# Patient Record
Sex: FEMALE | Race: WHITE | Employment: OTHER | URBAN - METROPOLITAN AREA
[De-identification: names, ages, dates, MRNs, and addresses within clinical notes are randomized per-mention and may not be internally consistent; named-entity substitution may affect disease eponyms.]

---

## 2022-02-22 ENCOUNTER — HOSPITAL ENCOUNTER (EMERGENCY)
Age: 72
Discharge: HOME | End: 2022-02-22
Attending: STUDENT IN AN ORGANIZED HEALTH CARE EDUCATION/TRAINING PROGRAM
Payer: MEDICARE

## 2022-02-22 VITALS
SYSTOLIC BLOOD PRESSURE: 180 MMHG | WEIGHT: 190 LBS | BODY MASS INDEX: 25.73 KG/M2 | OXYGEN SATURATION: 99 % | DIASTOLIC BLOOD PRESSURE: 99 MMHG | RESPIRATION RATE: 18 BRPM | TEMPERATURE: 98.7 F | HEIGHT: 72 IN | HEART RATE: 90 BPM

## 2022-02-22 DIAGNOSIS — M54.41 ACUTE BILATERAL LOW BACK PAIN WITH RIGHT-SIDED SCIATICA: Primary | ICD-10-CM

## 2022-02-22 DIAGNOSIS — R03.0 ELEVATED BLOOD PRESSURE READING: ICD-10-CM

## 2022-02-22 PROCEDURE — 99282 EMERGENCY DEPT VISIT SF MDM: CPT

## 2022-02-22 PROCEDURE — 96372 THER/PROPH/DIAG INJ SC/IM: CPT

## 2022-02-22 PROCEDURE — 6360000002 HC RX W HCPCS: Performed by: STUDENT IN AN ORGANIZED HEALTH CARE EDUCATION/TRAINING PROGRAM

## 2022-02-22 PROCEDURE — 6370000000 HC RX 637 (ALT 250 FOR IP): Performed by: STUDENT IN AN ORGANIZED HEALTH CARE EDUCATION/TRAINING PROGRAM

## 2022-02-22 RX ORDER — ACETAMINOPHEN 325 MG/1
650 TABLET ORAL ONCE
Status: COMPLETED | OUTPATIENT
Start: 2022-02-22 | End: 2022-02-22

## 2022-02-22 RX ORDER — ORPHENADRINE CITRATE 100 MG/1
100 TABLET, EXTENDED RELEASE ORAL 2 TIMES DAILY
Qty: 20 TABLET | Refills: 0 | Status: SHIPPED | OUTPATIENT
Start: 2022-02-22 | End: 2022-03-04

## 2022-02-22 RX ORDER — KETOROLAC TROMETHAMINE 30 MG/ML
15 INJECTION, SOLUTION INTRAMUSCULAR; INTRAVENOUS ONCE
Status: COMPLETED | OUTPATIENT
Start: 2022-02-22 | End: 2022-02-22

## 2022-02-22 RX ORDER — ACETAMINOPHEN 500 MG
500 TABLET ORAL 4 TIMES DAILY PRN
Qty: 120 TABLET | Refills: 0 | Status: SHIPPED | OUTPATIENT
Start: 2022-02-22

## 2022-02-22 RX ORDER — IBUPROFEN 600 MG/1
600 TABLET ORAL 3 TIMES DAILY PRN
Qty: 30 TABLET | Refills: 0 | Status: SHIPPED | OUTPATIENT
Start: 2022-02-22

## 2022-02-22 RX ORDER — LIDOCAINE 4 G/G
1 PATCH TOPICAL DAILY
Qty: 30 PATCH | Refills: 0 | Status: SHIPPED | OUTPATIENT
Start: 2022-02-22 | End: 2022-03-24

## 2022-02-22 RX ORDER — ORPHENADRINE CITRATE 30 MG/ML
60 INJECTION INTRAMUSCULAR; INTRAVENOUS ONCE
Status: COMPLETED | OUTPATIENT
Start: 2022-02-22 | End: 2022-02-22

## 2022-02-22 RX ADMIN — ACETAMINOPHEN 650 MG: 325 TABLET ORAL at 16:41

## 2022-02-22 RX ADMIN — KETOROLAC TROMETHAMINE 15 MG: 30 INJECTION, SOLUTION INTRAMUSCULAR at 16:41

## 2022-02-22 RX ADMIN — ORPHENADRINE CITRATE 60 MG: 30 INJECTION INTRAMUSCULAR; INTRAVENOUS at 16:41

## 2022-02-22 ASSESSMENT — PAIN DESCRIPTION - DESCRIPTORS: DESCRIPTORS: ACHING

## 2022-02-22 ASSESSMENT — PAIN DESCRIPTION - LOCATION: LOCATION: BACK

## 2022-02-22 ASSESSMENT — PAIN DESCRIPTION - PROGRESSION: CLINICAL_PROGRESSION: NOT CHANGED

## 2022-02-22 ASSESSMENT — PAIN DESCRIPTION - ORIENTATION: ORIENTATION: LOWER

## 2022-02-22 ASSESSMENT — PAIN DESCRIPTION - FREQUENCY: FREQUENCY: CONTINUOUS

## 2022-02-22 ASSESSMENT — PAIN SCALES - GENERAL: PAINLEVEL_OUTOF10: 8

## 2022-02-22 ASSESSMENT — PAIN DESCRIPTION - PAIN TYPE: TYPE: CHRONIC PAIN

## 2022-02-22 ASSESSMENT — PAIN DESCRIPTION - ONSET: ONSET: ON-GOING

## 2022-02-22 NOTE — ED PROVIDER NOTES
Department of Emergency Medicine   ED  Provider Note  Admit Date/RoomTime: 2/22/2022  3:32 PM  ED Room: 08/08          History of Present Illness:  2/22/22, Time: 4:33 PM EST  Chief Complaint   Patient presents with    Back Pain     on going for months. Bernice Byers is a 73 y.o. female presenting to the ED for low back pain. The patient states that she has had sciatica for years. For the past 2 to 3 weeks she has been having low back pain. This is a dull ache but at times it shoots down her bilateral legs. This is similar to her prior sciatica. Pain is intermittent. Certain positions seem to worsen it. Nothing improves it. She is not tried any medication. She denies any recent trauma, numbness, tingling or weakness. No incontinence. No history of IV drug use. No fevers. No perirectal paresthesias. Patient ambulates without difficulty. No dysuria or hematuria. No chest pain, shortness of breath or abdominal pain. Review of Systems:   Constitutional symptoms: Denies fever  Eyes: Denies eye pain  Ears, Nose, Mouth, Throat: Denies ear pain  Cardiovascular: Denies chest pain  Respiratory: Denies shortness of breath  Gastrointestinal: Denies blood per rectum  Genitourinary: Denies hematuria  Skin: Denies rashes  Neurological: Denies headache  Musculoskeletal: Positive for low back pain shooting down the legs    --------------------------------------------- PAST HISTORY ---------------------------------------------  Past Medical History:  has a past medical history of Nephritis. Past Surgical History:  has a past surgical history that includes Hysterectomy and Tonsillectomy. Social History:  reports that she has quit smoking. She does not have any smokeless tobacco history on file. She reports previous alcohol use. She reports that she does not use drugs. Family History: family history is not on file. . Unless otherwise noted, family history is non contributory    The patients home medications have been reviewed. Allergies: Pcn [penicillins]    I have reviewed the past medical history, past surgical history, social history, and family history    ---------------------------------------------------PHYSICAL EXAM--------------------------------------    General: The patient is conversational and lying comfortably in bed. Head: Normocephalic and atraumatic. Eyes: Sclera non-icteric and no conjunctival injection  ENT: Nasal and oral membranes moist  Neck: Trachea midline. No JVD  Respiratory: Lungs clear to auscultation bilaterally. Patient speaking in full sentences. Cardiovascular: Regular rate. No pedal edema. 2+ DP pulses  Gastrointestinal:  Abdomen is soft and nondistended. Bowel sounds present. There is no tenderness. There is no guarding or rebound. Musculoskeletal: No deformity. No tenderness of the midline spine. No step-offs or deformities. Pelvis stable. No bony tenderness of the extremities. Patient able to flex and extend at the hips and knees. Plantar flexion dorsiflexion of the ankle symmetric. Positive straight leg raise on the right and negative on the left  Skin: Skin warm and dry. No rashes. Neurologic: No gross motor deficits. No aphasia. 5 out of 5 strength of the lower extremities. Sensation intact to light touch. Patient ambulating without difficulty  Psychiatric: Normal affect.    -------------------------------------------------- RESULTS -------------------------------------------------  I have personally reviewed all laboratory and imaging results for this patient. Results are listed below.      LABS: (Lab results interpreted by me)  No results found for this visit on 02/22/22.,     RADIOLOGY:  Interpreted by Radiologist unless otherwise specified  No orders to display       ------------------------- NURSING NOTES AND VITALS REVIEWED ---------------------------   The nursing notes within the ED encounter and vital signs as below have been reviewed by myself  BP (!) 180/99   Pulse 90   Temp 98.7 °F (37.1 °C) (Tympanic)   Resp 18   Ht 6' (1.829 m)   Wt 190 lb (86.2 kg)   SpO2 99%   BMI 25.77 kg/m²     Oxygen Saturation Interpretation: Normal    The patients available past medical records and past encounters were reviewed. ------------------------------ ED COURSE/MEDICAL DECISION MAKING----------------------  Medications   ketorolac (TORADOL) injection 15 mg (15 mg IntraMUSCular Given 2/22/22 1641)   acetaminophen (TYLENOL) tablet 650 mg (650 mg Oral Given 2/22/22 1641)   orphenadrine (NORFLEX) injection 60 mg (60 mg IntraMUSCular Given 2/22/22 1641)       Medical Decision Making: This is a 73 y.o. female presenting to the ED for low back pain. On initial presentation, the patient's vital signs are interpreted as hypertensive, afebrile and saturating well on room air. Based on history and physical exam, we have a broad differential.  The patient has known history of sciatica. Her presentation is consistent with sciatica today. No history of trauma or infection. Patient distally neurovascularly intact. At this time, she will be given Toradol, Tylenol and Norflex. Patient symptoms have improved. She is ambulatory. She will be given prescriptions for Tylenol, ibuprofen, lidocaine patches and Norflex. She is given neurosurgery contact information as well as a primary care physician. She is given strict return precautions and should follow up. Her blood pressure is elevated today and this will require recheck by her primary physician. Verbalized understanding and agreeable to the plan. This patient's ED course included: a personal history and physicial examination and re-evaluation prior to disposition    This patient has remained hemodynamically stable during their ED course. Consultations:  None    Oxygen Saturation Interpretation: 99 % on room air.   Normal    Counseling:   I have spoken with the patient and discussed todays results, in addition to providing specific details for the plan of care and counseling regarding the diagnosis and prognosis. Questions are answered at this time and they are agreeable with the plan.     --------------------------------- IMPRESSION AND DISPOSITION ---------------------------------    IMPRESSION  1. Acute bilateral low back pain with right-sided sciatica    2. Elevated blood pressure reading        DISPOSITION  Disposition: Discharge to home  Patient condition is fair    I, Dr. Eden Arenas, am the primary provider of record    NOTE: This report was transcribed using voice recognition software.  Every effort was made to ensure accuracy; however, inadvertent computerized transcription errors may be present        Eden Arenas MD  02/22/22 5762

## 2022-06-06 ENCOUNTER — APPOINTMENT (OUTPATIENT)
Dept: GENERAL RADIOLOGY | Age: 72
DRG: 078 | End: 2022-06-06
Payer: MEDICARE

## 2022-06-06 ENCOUNTER — HOSPITAL ENCOUNTER (INPATIENT)
Age: 72
LOS: 6 days | Discharge: NURSING, REHAB OR LONG-TERM CARE | DRG: 078 | End: 2022-06-13
Attending: EMERGENCY MEDICINE | Admitting: FAMILY MEDICINE
Payer: MEDICARE

## 2022-06-06 ENCOUNTER — APPOINTMENT (OUTPATIENT)
Dept: CT IMAGING | Age: 72
DRG: 078 | End: 2022-06-06
Payer: MEDICARE

## 2022-06-06 DIAGNOSIS — J18.9 COMMUNITY ACQUIRED PNEUMONIA OF RIGHT LOWER LOBE OF LUNG: ICD-10-CM

## 2022-06-06 DIAGNOSIS — N17.9 AKI (ACUTE KIDNEY INJURY) (HCC): ICD-10-CM

## 2022-06-06 DIAGNOSIS — R41.82 ALTERED MENTAL STATUS, UNSPECIFIED ALTERED MENTAL STATUS TYPE: Primary | ICD-10-CM

## 2022-06-06 DIAGNOSIS — N30.00 ACUTE CYSTITIS WITHOUT HEMATURIA: ICD-10-CM

## 2022-06-06 LAB
ACETAMINOPHEN LEVEL: <5 MCG/ML (ref 10–30)
ALBUMIN SERPL-MCNC: 4.3 G/DL (ref 3.5–5.2)
ALP BLD-CCNC: 74 U/L (ref 35–104)
ALT SERPL-CCNC: 9 U/L (ref 0–32)
AMMONIA: 18 UMOL/L (ref 11–51)
ANION GAP SERPL CALCULATED.3IONS-SCNC: 13 MMOL/L (ref 7–16)
APTT: 22.5 SEC (ref 24.5–35.1)
AST SERPL-CCNC: 14 U/L (ref 0–31)
BASOPHILS ABSOLUTE: 0.05 E9/L (ref 0–0.2)
BASOPHILS RELATIVE PERCENT: 0.4 % (ref 0–2)
BILIRUB SERPL-MCNC: 0.6 MG/DL (ref 0–1.2)
BUN BLDV-MCNC: 18 MG/DL (ref 6–23)
CALCIUM SERPL-MCNC: 9.2 MG/DL (ref 8.6–10.2)
CHLORIDE BLD-SCNC: 101 MMOL/L (ref 98–107)
CHP ED QC CHECK: NORMAL
CO2: 23 MMOL/L (ref 22–29)
CREAT SERPL-MCNC: 1.2 MG/DL (ref 0.5–1)
EOSINOPHILS ABSOLUTE: 0.07 E9/L (ref 0.05–0.5)
EOSINOPHILS RELATIVE PERCENT: 0.5 % (ref 0–6)
ETHANOL: <10 MG/DL (ref 0–0.08)
GFR AFRICAN AMERICAN: 54
GFR NON-AFRICAN AMERICAN: 44 ML/MIN/1.73
GLUCOSE BLD-MCNC: 103 MG/DL
GLUCOSE BLD-MCNC: 117 MG/DL (ref 74–99)
HCT VFR BLD CALC: 43.1 % (ref 34–48)
HEMOGLOBIN: 14.4 G/DL (ref 11.5–15.5)
IMMATURE GRANULOCYTES #: 0.07 E9/L
IMMATURE GRANULOCYTES %: 0.5 % (ref 0–5)
INR BLD: 1
LACTIC ACID: 0.7 MMOL/L (ref 0.5–2.2)
LYMPHOCYTES ABSOLUTE: 1.75 E9/L (ref 1.5–4)
LYMPHOCYTES RELATIVE PERCENT: 13.1 % (ref 20–42)
MCH RBC QN AUTO: 30.5 PG (ref 26–35)
MCHC RBC AUTO-ENTMCNC: 33.4 % (ref 32–34.5)
MCV RBC AUTO: 91.3 FL (ref 80–99.9)
METER GLUCOSE: 103 MG/DL (ref 74–99)
MONOCYTES ABSOLUTE: 0.95 E9/L (ref 0.1–0.95)
MONOCYTES RELATIVE PERCENT: 7.1 % (ref 2–12)
NEUTROPHILS ABSOLUTE: 10.5 E9/L (ref 1.8–7.3)
NEUTROPHILS RELATIVE PERCENT: 78.4 % (ref 43–80)
PDW BLD-RTO: 12.8 FL (ref 11.5–15)
PLATELET # BLD: 256 E9/L (ref 130–450)
PMV BLD AUTO: 8.8 FL (ref 7–12)
POTASSIUM REFLEX MAGNESIUM: 4.1 MMOL/L (ref 3.5–5)
PRO-BNP: 565 PG/ML (ref 0–125)
PROTHROMBIN TIME: 10.8 SEC (ref 9.3–12.4)
RBC # BLD: 4.72 E12/L (ref 3.5–5.5)
REASON FOR REJECTION: NORMAL
REJECTED TEST: NORMAL
SALICYLATE, SERUM: <0.3 MG/DL (ref 0–30)
SODIUM BLD-SCNC: 137 MMOL/L (ref 132–146)
TOTAL CK: 42 U/L (ref 20–180)
TOTAL PROTEIN: 7 G/DL (ref 6.4–8.3)
TRICYCLIC ANTIDEPRESSANTS SCREEN SERUM: NEGATIVE NG/ML
TROPONIN, HIGH SENSITIVITY: 21 NG/L (ref 0–9)
TROPONIN, HIGH SENSITIVITY: 22 NG/L (ref 0–9)
WBC # BLD: 13.4 E9/L (ref 4.5–11.5)

## 2022-06-06 PROCEDURE — 85610 PROTHROMBIN TIME: CPT

## 2022-06-06 PROCEDURE — 83880 ASSAY OF NATRIURETIC PEPTIDE: CPT

## 2022-06-06 PROCEDURE — 80179 DRUG ASSAY SALICYLATE: CPT

## 2022-06-06 PROCEDURE — 71045 X-RAY EXAM CHEST 1 VIEW: CPT

## 2022-06-06 PROCEDURE — 93005 ELECTROCARDIOGRAM TRACING: CPT | Performed by: STUDENT IN AN ORGANIZED HEALTH CARE EDUCATION/TRAINING PROGRAM

## 2022-06-06 PROCEDURE — 80143 DRUG ASSAY ACETAMINOPHEN: CPT

## 2022-06-06 PROCEDURE — 81001 URINALYSIS AUTO W/SCOPE: CPT

## 2022-06-06 PROCEDURE — 70450 CT HEAD/BRAIN W/O DYE: CPT

## 2022-06-06 PROCEDURE — 82077 ASSAY SPEC XCP UR&BREATH IA: CPT

## 2022-06-06 PROCEDURE — 83605 ASSAY OF LACTIC ACID: CPT

## 2022-06-06 PROCEDURE — 85025 COMPLETE CBC W/AUTO DIFF WBC: CPT

## 2022-06-06 PROCEDURE — 84484 ASSAY OF TROPONIN QUANT: CPT

## 2022-06-06 PROCEDURE — 85730 THROMBOPLASTIN TIME PARTIAL: CPT

## 2022-06-06 PROCEDURE — 80053 COMPREHEN METABOLIC PANEL: CPT

## 2022-06-06 PROCEDURE — 80307 DRUG TEST PRSMV CHEM ANLYZR: CPT

## 2022-06-06 PROCEDURE — 82550 ASSAY OF CK (CPK): CPT

## 2022-06-06 PROCEDURE — 82962 GLUCOSE BLOOD TEST: CPT

## 2022-06-06 PROCEDURE — 82140 ASSAY OF AMMONIA: CPT

## 2022-06-06 PROCEDURE — 51701 INSERT BLADDER CATHETER: CPT

## 2022-06-06 RX ORDER — LEVOFLOXACIN 5 MG/ML
750 INJECTION, SOLUTION INTRAVENOUS ONCE
Status: DISCONTINUED | OUTPATIENT
Start: 2022-06-06 | End: 2022-06-07

## 2022-06-06 ASSESSMENT — PAIN - FUNCTIONAL ASSESSMENT: PAIN_FUNCTIONAL_ASSESSMENT: NONE - DENIES PAIN

## 2022-06-06 NOTE — LETTER
[address] Medicaid  CERTIFICATION OF NECESSITY  FOR TRANSPORTATION   BY WHEELCHAIR VAN     Individual Information   1. Name: Avinash Asekw 2. [place] Medicaid Billing Number:    3. Address: [address] [place] [address]  [address]      Transportation Provider Information   4. Provider Name: rufino   5. [place] Medicaid Provider Number:  National Provider Identifier (NPI):      Certification  7. Criteria:  By signing this document, the practitioner certifies that two statements are true:  A. This individual must be accompanied by a mobility-related assistive device from the point of pick-up to the point of drop-off. B. Transport of this individual by standard passenger vehicle or common carrier is precluded or contraindicated. 8. Period Beginning Date: 2022     9. Length  [x] Not more than 1 day(s)  [] One Year     Additional Information Relevant to Certification   10. Comments or Explanations, If Necessary or Appropriate     Weakness        Certifying Practitioner Information   11. Name of Practitioner: Sera Licea MD   12. [place] Medicaid Provider Number, If Applicable:  [address] Provider Identifier (NPI):      Signature Information   14. Date of Signature: 2022  15. Name of Person Signing: Judy Holbrook RN    16. Signature and Professional Designation: Electronically signed by Judy Holbrook RN on 2022 at 11:05 AM       [facility] [id number]  Rev. 2015          [facility] [address] Encounter Date/Time: 2022 [facility] Account: [de-identified]    MRN: [id number]    Patient: Avinash Askew    Contact Serial #: [id number]      ENCOUNTER          Patient Class: I Private Enc?   No Unit RM BD: SEYZ 5-B   Hospital Service: MED   Encounter DX: Pneumonia [J18.9]   ADM Provider: Dale Tucker DO   Procedure:     ATT Provider: Sera Licea MD   REF Provider:        Admission DX: Pneumonia and DX codes: J18.9      PATIENT                 Name: Avinash Askew : 1950 (71 yrs)   Address: [address] Sex: Female   City: [address] [place] [address]         Marital Status: Unknown   Employer: RETIRED         Sikh: Anabaptist   Primary Care Provider:           Primary Phone: [phone]   EMERGENCY CONTACT   Contact Name Legal Guardian? Relationship to Patient Home Phone Work Phone   1. *No Contact Specified*  2. *No Contact Specified*                             GUARANTOR            Guarantor: Jovita Meléndez     : 1950   Address: [address] [place] [address] Sex: Female     [place] [id number]     Relation to Patient: Self       Home Phone: [phone]   Guarantor ID: [id number]       Work Phone:     Guarantor Employer: RETIRED         Status: RETIRED      COVERAGE        PRIMARY INSURANCE   Payor: MEDICARE Plan: MEDICARE PART A AND B   Payor Address: [place] [address],  [facility], [facility]       Group Number:   Insurance Type: INDEMNITY   Subscriber Name: Gretchen Guerrero : 1950   Subscriber ID: [id number] Pat. Rel. to Sub: Self   SECONDARY INSURANCE   Payor:   Plan:     Payor Address:  ,           Group Number:   Insurance Type:     Subscriber Name:   Subscriber :     Subscriber ID:   Pat.  Rel. to Sub:             CSN: [id number]

## 2022-06-06 NOTE — LETTER
[address] [address]  Phone: [phone]  Fax: [phone]      June 13, 2022     Patient: Rhiannon Hope   MR Number: [id number]   YOB: 1950   Date of Visit: 6/6/2022       To Whom It May Concern: It is my professional opinion that Rhiannon Hope is not capable of safely living independently at this time. I recommend 24 hour supervision by a responsible party. If you have questions, please do not hesitate to call me.      Sincerely,      Kathleen Plaza, [facility] [address] [facility] Physician Hospitalist  [phone]

## 2022-06-07 PROBLEM — J18.9 PNEUMONIA: Status: ACTIVE | Noted: 2022-06-07

## 2022-06-07 LAB
ALBUMIN SERPL-MCNC: 4.1 G/DL (ref 3.5–5.2)
ALP BLD-CCNC: 75 U/L (ref 35–104)
ALT SERPL-CCNC: 9 U/L (ref 0–32)
AMPHETAMINE SCREEN, URINE: NOT DETECTED
ANION GAP SERPL CALCULATED.3IONS-SCNC: 14 MMOL/L (ref 7–16)
AST SERPL-CCNC: 15 U/L (ref 0–31)
BACTERIA: ABNORMAL /HPF
BARBITURATE SCREEN URINE: NOT DETECTED
BASOPHILS ABSOLUTE: 0.05 E9/L (ref 0–0.2)
BASOPHILS RELATIVE PERCENT: 0.4 % (ref 0–2)
BENZODIAZEPINE SCREEN, URINE: NOT DETECTED
BILIRUB SERPL-MCNC: 0.6 MG/DL (ref 0–1.2)
BILIRUBIN URINE: NEGATIVE
BLOOD, URINE: NEGATIVE
BUN BLDV-MCNC: 17 MG/DL (ref 6–23)
CALCIUM SERPL-MCNC: 9.2 MG/DL (ref 8.6–10.2)
CANNABINOID SCREEN URINE: NOT DETECTED
CHLORIDE BLD-SCNC: 101 MMOL/L (ref 98–107)
CLARITY: ABNORMAL
CO2: 20 MMOL/L (ref 22–29)
COCAINE METABOLITE SCREEN URINE: NOT DETECTED
COLOR: YELLOW
CREAT SERPL-MCNC: 1.2 MG/DL (ref 0.5–1)
EOSINOPHILS ABSOLUTE: 0.13 E9/L (ref 0.05–0.5)
EOSINOPHILS RELATIVE PERCENT: 1 % (ref 0–6)
EPITHELIAL CELLS, UA: ABNORMAL /HPF
FENTANYL SCREEN, URINE: NOT DETECTED
GFR AFRICAN AMERICAN: 54
GFR NON-AFRICAN AMERICAN: 44 ML/MIN/1.73
GLUCOSE BLD-MCNC: 124 MG/DL (ref 74–99)
GLUCOSE URINE: NEGATIVE MG/DL
HCT VFR BLD CALC: 42 % (ref 34–48)
HEMOGLOBIN: 14 G/DL (ref 11.5–15.5)
IMMATURE GRANULOCYTES #: 0.06 E9/L
IMMATURE GRANULOCYTES %: 0.5 % (ref 0–5)
KETONES, URINE: NEGATIVE MG/DL
LACTIC ACID: 1 MMOL/L (ref 0.5–2.2)
LEUKOCYTE ESTERASE, URINE: NEGATIVE
LYMPHOCYTES ABSOLUTE: 1.73 E9/L (ref 1.5–4)
LYMPHOCYTES RELATIVE PERCENT: 13 % (ref 20–42)
Lab: NORMAL
MCH RBC QN AUTO: 30.2 PG (ref 26–35)
MCHC RBC AUTO-ENTMCNC: 33.3 % (ref 32–34.5)
MCV RBC AUTO: 90.7 FL (ref 80–99.9)
METHADONE SCREEN, URINE: NOT DETECTED
MONOCYTES ABSOLUTE: 1.06 E9/L (ref 0.1–0.95)
MONOCYTES RELATIVE PERCENT: 8 % (ref 2–12)
NEUTROPHILS ABSOLUTE: 10.24 E9/L (ref 1.8–7.3)
NEUTROPHILS RELATIVE PERCENT: 77.1 % (ref 43–80)
NITRITE, URINE: POSITIVE
OPIATE SCREEN URINE: NOT DETECTED
OXYCODONE URINE: NOT DETECTED
PDW BLD-RTO: 13 FL (ref 11.5–15)
PH UA: 5.5 (ref 5–9)
PHENCYCLIDINE SCREEN URINE: NOT DETECTED
PLATELET # BLD: 274 E9/L (ref 130–450)
PMV BLD AUTO: 8.9 FL (ref 7–12)
POTASSIUM REFLEX MAGNESIUM: 4.1 MMOL/L (ref 3.5–5)
PROCALCITONIN: 0.06 NG/ML (ref 0–0.08)
PROTEIN UA: 30 MG/DL
RBC # BLD: 4.63 E12/L (ref 3.5–5.5)
RBC UA: ABNORMAL /HPF (ref 0–2)
SODIUM BLD-SCNC: 135 MMOL/L (ref 132–146)
SPECIFIC GRAVITY UA: 1.02 (ref 1–1.03)
TOTAL PROTEIN: 6.9 G/DL (ref 6.4–8.3)
UROBILINOGEN, URINE: 0.2 E.U./DL
WBC # BLD: 13.3 E9/L (ref 4.5–11.5)
WBC UA: ABNORMAL /HPF (ref 0–5)

## 2022-06-07 PROCEDURE — 99285 EMERGENCY DEPT VISIT HI MDM: CPT

## 2022-06-07 PROCEDURE — 36415 COLL VENOUS BLD VENIPUNCTURE: CPT

## 2022-06-07 PROCEDURE — 2580000003 HC RX 258: Performed by: FAMILY MEDICINE

## 2022-06-07 PROCEDURE — 87088 URINE BACTERIA CULTURE: CPT

## 2022-06-07 PROCEDURE — 87040 BLOOD CULTURE FOR BACTERIA: CPT

## 2022-06-07 PROCEDURE — 6360000002 HC RX W HCPCS: Performed by: FAMILY MEDICINE

## 2022-06-07 PROCEDURE — 83605 ASSAY OF LACTIC ACID: CPT

## 2022-06-07 PROCEDURE — 84145 PROCALCITONIN (PCT): CPT

## 2022-06-07 PROCEDURE — 2500000003 HC RX 250 WO HCPCS: Performed by: STUDENT IN AN ORGANIZED HEALTH CARE EDUCATION/TRAINING PROGRAM

## 2022-06-07 PROCEDURE — 2500000003 HC RX 250 WO HCPCS: Performed by: INTERNAL MEDICINE

## 2022-06-07 PROCEDURE — 85025 COMPLETE CBC W/AUTO DIFF WBC: CPT

## 2022-06-07 PROCEDURE — 87186 SC STD MICRODIL/AGAR DIL: CPT

## 2022-06-07 PROCEDURE — 1200000000 HC SEMI PRIVATE

## 2022-06-07 PROCEDURE — 87077 CULTURE AEROBIC IDENTIFY: CPT

## 2022-06-07 PROCEDURE — 97165 OT EVAL LOW COMPLEX 30 MIN: CPT

## 2022-06-07 PROCEDURE — 80053 COMPREHEN METABOLIC PANEL: CPT

## 2022-06-07 PROCEDURE — 87150 DNA/RNA AMPLIFIED PROBE: CPT

## 2022-06-07 PROCEDURE — 6370000000 HC RX 637 (ALT 250 FOR IP): Performed by: FAMILY MEDICINE

## 2022-06-07 RX ORDER — ACETAMINOPHEN 650 MG/1
650 SUPPOSITORY RECTAL EVERY 6 HOURS PRN
Status: DISCONTINUED | OUTPATIENT
Start: 2022-06-07 | End: 2022-06-13 | Stop reason: HOSPADM

## 2022-06-07 RX ORDER — ACETAMINOPHEN 325 MG/1
650 TABLET ORAL EVERY 6 HOURS PRN
Status: DISCONTINUED | OUTPATIENT
Start: 2022-06-07 | End: 2022-06-13 | Stop reason: HOSPADM

## 2022-06-07 RX ORDER — SODIUM CHLORIDE 0.9 % (FLUSH) 0.9 %
10 SYRINGE (ML) INJECTION PRN
Status: DISCONTINUED | OUTPATIENT
Start: 2022-06-07 | End: 2022-06-13 | Stop reason: HOSPADM

## 2022-06-07 RX ORDER — SODIUM CHLORIDE 9 MG/ML
INJECTION, SOLUTION INTRAVENOUS PRN
Status: DISCONTINUED | OUTPATIENT
Start: 2022-06-07 | End: 2022-06-13 | Stop reason: HOSPADM

## 2022-06-07 RX ORDER — PROMETHAZINE HYDROCHLORIDE 12.5 MG/1
12.5 TABLET ORAL EVERY 6 HOURS PRN
Status: DISCONTINUED | OUTPATIENT
Start: 2022-06-07 | End: 2022-06-13 | Stop reason: HOSPADM

## 2022-06-07 RX ORDER — LEVOFLOXACIN 5 MG/ML
750 INJECTION, SOLUTION INTRAVENOUS EVERY 24 HOURS
Status: DISCONTINUED | OUTPATIENT
Start: 2022-06-07 | End: 2022-06-07

## 2022-06-07 RX ORDER — LABETALOL HYDROCHLORIDE 5 MG/ML
10 INJECTION, SOLUTION INTRAVENOUS ONCE
Status: COMPLETED | OUTPATIENT
Start: 2022-06-07 | End: 2022-06-07

## 2022-06-07 RX ORDER — HYDRALAZINE HYDROCHLORIDE 20 MG/ML
10 INJECTION INTRAMUSCULAR; INTRAVENOUS EVERY 4 HOURS PRN
Status: DISCONTINUED | OUTPATIENT
Start: 2022-06-07 | End: 2022-06-09

## 2022-06-07 RX ORDER — ENOXAPARIN SODIUM 100 MG/ML
40 INJECTION SUBCUTANEOUS DAILY
Status: DISCONTINUED | OUTPATIENT
Start: 2022-06-07 | End: 2022-06-08

## 2022-06-07 RX ORDER — SODIUM CHLORIDE 0.9 % (FLUSH) 0.9 %
10 SYRINGE (ML) INJECTION EVERY 12 HOURS SCHEDULED
Status: DISCONTINUED | OUTPATIENT
Start: 2022-06-07 | End: 2022-06-13 | Stop reason: HOSPADM

## 2022-06-07 RX ORDER — LEVOFLOXACIN 5 MG/ML
750 INJECTION, SOLUTION INTRAVENOUS EVERY 24 HOURS
Status: DISCONTINUED | OUTPATIENT
Start: 2022-06-08 | End: 2022-06-07

## 2022-06-07 RX ORDER — ONDANSETRON 2 MG/ML
4 INJECTION INTRAMUSCULAR; INTRAVENOUS EVERY 6 HOURS PRN
Status: DISCONTINUED | OUTPATIENT
Start: 2022-06-07 | End: 2022-06-13 | Stop reason: HOSPADM

## 2022-06-07 RX ORDER — POLYETHYLENE GLYCOL 3350 17 G/17G
17 POWDER, FOR SOLUTION ORAL DAILY PRN
Status: DISCONTINUED | OUTPATIENT
Start: 2022-06-07 | End: 2022-06-13 | Stop reason: HOSPADM

## 2022-06-07 RX ADMIN — ANTI-FUNGAL POWDER MICONAZOLE NITRATE TALC FREE: 1.42 POWDER TOPICAL at 17:47

## 2022-06-07 RX ADMIN — SODIUM CHLORIDE, PRESERVATIVE FREE 10 ML: 5 INJECTION INTRAVENOUS at 10:51

## 2022-06-07 RX ADMIN — HYDRALAZINE HYDROCHLORIDE 10 MG: 20 INJECTION INTRAMUSCULAR; INTRAVENOUS at 07:05

## 2022-06-07 RX ADMIN — ACETAMINOPHEN 650 MG: 325 TABLET ORAL at 15:15

## 2022-06-07 RX ADMIN — ANTI-FUNGAL POWDER MICONAZOLE NITRATE TALC FREE: 1.42 POWDER TOPICAL at 23:15

## 2022-06-07 RX ADMIN — LEVOFLOXACIN 750 MG: 5 INJECTION, SOLUTION INTRAVENOUS at 01:06

## 2022-06-07 RX ADMIN — ONDANSETRON 4 MG: 2 INJECTION INTRAMUSCULAR; INTRAVENOUS at 08:10

## 2022-06-07 RX ADMIN — LABETALOL HYDROCHLORIDE 10 MG: 5 INJECTION INTRAVENOUS at 13:05

## 2022-06-07 RX ADMIN — HYDRALAZINE HYDROCHLORIDE 10 MG: 20 INJECTION INTRAMUSCULAR; INTRAVENOUS at 10:51

## 2022-06-07 RX ADMIN — SODIUM CHLORIDE, PRESERVATIVE FREE 10 ML: 5 INJECTION INTRAVENOUS at 07:05

## 2022-06-07 RX ADMIN — ENOXAPARIN SODIUM 40 MG: 100 INJECTION SUBCUTANEOUS at 13:05

## 2022-06-07 ASSESSMENT — PAIN SCALES - GENERAL: PAINLEVEL_OUTOF10: 3

## 2022-06-07 NOTE — PROGRESS NOTES
[facility] [address] [address] [facility]      Date:2022                                                  Patient Name: Evangelina Sim  MRN: [id number]  : 1950  Room: [address] [place], [place] [address]A    Evaluating OT: Naif West, [address], OTR/L [id number]  Referring Holly Perales DO  Specific Provider Orders: OT eval and treat   Recommended Adaptive Equipment: shower chair     Diagnosis: PNA   Surgery: none   Pertinent Medical History: none on file   Precautions:  Fall Risk    Assessment of current deficits   [x] Functional mobility  [x]ADLs  [x] Strength               [x]Cognition   [x] Functional transfers   [x] IADLs         [x] Safety Awareness   [x]Endurance   [] Fine Coordination              [x] Balance      [] Vision/perception   [x]Sensation    []Gross Motor Coordination  [] ROM  [] Delirium                   [] Motor Control     OT PLAN OF CARE   OT POC based on physician orders, patient diagnosis and results of clinical assessment    Frequency/Duration: 2-3 days/wk for 2 weeks PRN   Specific OT Treatment to include:   * Instruction/training on adapted ADL techniques and AE recommendations to increase functional independence within precautions       * Training on energy conservation strategies, correct breathing pattern and techniques to improve independence/tolerance for self-care routine  * Functional transfer/mobility training/DME recommendations for increased independence, safety, and fall prevention  * Patient/Family education to increase follow through with safety techniques and functional independence  * Recommendation of environmental modifications for increased safety with functional transfers/mobility and ADLs  * Cognitive retraining/development of therapeutic activities to improve problem solving, judgement, memory, and attention for increased safety/participation in ADL/IADL tasks  * Therapeutic exercise to improve motor endurance, ROM, and functional strength for ADLs/functional transfers  * Therapeutic activities to facilitate/challenge dynamic balance, stand tolerance for increased safety and independence with ADLs  * Neuro-muscular re-education: facilitation of righting/equilibrium reactions, midline orientation, scapular stability/mobility, normalization of muscle tone, and facilitation of volitional active controled movement    Home Living: Pt lives alone in a 2 story home; bed/bath on 2nd level   Bathroom setup: walk in shower   Equipment owned: [facility]  Prior Level of Function: IND with ADLs/IADLs; using no AD for functional mobility   Driving: yes    Pain Level: 0/10  Cognition: A&O: 1/4 (self); Follows 1 step directions, with repetition and increased time   Memory:  fair    Sequencing:  fair    Problem solving:  fair    Judgement/safety:  fair     Functional Assessment:  AM-PAC Daily Activity Raw Score: 18/24   Initial Eval Status  Date: 6/7/22 Treatment Status  Date: STGs=LTGs  Time Frame: 10-14 days   Feeding SBA   SUP   Grooming SBA (standing at sink)   SUP   UB Dressing SBA   SUP   LB Dressing SBA (pt able to use crossing of leg technique to doff/elvira B socks)  SUP    Bathing SBA (simulated)  SUP    Toileting SBA  SUP   Bed Mobility  Log roll: Min A  Supine to sit: Min A  Sit to supine: Min A   Log roll SBA  Supine to sit: SBA   Sit to supine: SBA   Functional Transfers Sit to stand:SBA   Stand to sit:SBA  Commode: SBA  SUP   Functional Mobility SBA (using no AD, to/from bathroom)  SUP   Balance Sitting: SBA  Standing: SBA     Activity Tolerance fair     Visual/  Perceptual Glasses: none              UE ROM: RUE:  WFL  LUE:  WFL  Strength: RUE: grossly 4/5 LUE: grossly 4/5   Strength: B WFL  Fine Motor Coordination:  WFL     Hearing: WFL  Sensation:  No c/o numbness/tingling   Tone:  WFL  Edema: none noted                            Comments:Cleared by RN to see pt.  Upon arrival, patient supine in bed and agreeable to OT session. At end of session, patient supine in bed with call light and phone within reach, all lines and tubes intact. Pt would benefit from continued OT to increase functional independence and quality of life. Treatment: Required re-orientation to year/month/place. Pt appeared latent with following commands and answering questions. Pt required vc's and physical assist for proper technique/safety with hand placement/body mechanics/posture for bed mobility/ADLs/functional tranfers/mobility. Pt required vc's for sequencing/initiation of ADLs/functional transfers. Pt required rest breaks during session. Pt had an episode of emesis at end of session, RN notified. Pt appeared to have tolerated session well and appears motivated/cooperative/pleasant . Pt instructed on use of call light for assistance and fall prevention. Pt demo'ing fair understanding of education provided. Continue to educate. Eval Complexity: Low    Rehab Potential: Good for established goals, pt. assisted in establishment of goals. LTG: maximize independence with ADLs to return to PLOF    Patient  instructed on diagnosis, prognosis/goals and plan of care. Demonstrated fair understanding. [] Malnutrition indicators have been identified and nursing has been notified to ensure a dietitian consult is ordered. Evaluation time includes thorough review of current medical information, gathering information on past medical & social history & PLOF, completion of standardized testing, informal observation of tasks, consultation with other medical professions/disciplines, assessment of data & development of POC/goals.      Time In: 1979       Time Out: 0800     Total treatment time: 0       Treatment Charges: Mins Units   OT Eval Low 47603 X    OT Eval Medium 21123     OT Eval High 11599     OT Re-Eval 57623     Ther Ex  88951       Manual Therapy [address] [facility] [address]       ADL/Home t 93475     Neuro Re-ed 96969       Group Therapy        Orthotic manage/training  61521       Non-Billable Time           Aleyda Ann, OTR/L [id number]

## 2022-06-07 NOTE — ED NOTES
PT ambulates to restroom with unsteady gait, PT friend says this is her normal walking pattern     Jonny HsiehKaleida Health  06/07/22 5796

## 2022-06-07 NOTE — ED PROVIDER NOTES
[facility]  Department of Emergency Medicine     Written by: Lisa Romo DO  Patient Name: Kesha Nuñez Date: 2022  6:08 PM  MRN: [id number]                   : 1950        Chief Complaint   Patient presents with    Altered Mental Status     male friend called EMS for AMS for 2 days, patient unsure why she is here    - Chief complaint    Patient is a 72-year-old female no significant past medical history. Patient presents with chief complaint of altered mental status. Patient recently moved in with a male friend and for the last 2 days has found to be altered. Patient is usually ANO x4. However for the last couple days she has been acting appropriately and is alert and oriented only person and place. Does appear to be moderate in severity and constant since onset. On arrival patient is alert and oriented to person as well as place but not time or condition. She denies any specific complaints. She states that she has been eating and drinking well. Further history review of systems limited secondary to patient's altered mental status. The history is provided by the patient. No  was used. Review of Systems   Unable to perform ROS: Mental status change        Physical Exam  Vitals and nursing note reviewed. Constitutional:       General: She is not in acute distress. Appearance: Normal appearance. HENT:      Head: Normocephalic and atraumatic. Nose: No congestion or rhinorrhea. Mouth/Throat:      Mouth: Mucous membranes are moist.      Pharynx: Oropharynx is clear. Eyes:      Extraocular Movements: Extraocular movements intact. Pupils: Pupils are equal, round, and reactive to light. Cardiovascular:      Rate and Rhythm: Normal rate and regular rhythm. Heart sounds: No murmur heard. No gallop. Pulmonary:      Effort: Pulmonary effort is normal. No respiratory distress. Breath sounds:  No wheezing, rhonchi or rales. Abdominal:      General: Abdomen is flat. Palpations: Abdomen is soft. There is no mass. Tenderness: There is no abdominal tenderness. There is no guarding. Hernia: No hernia is present. Musculoskeletal:         General: No swelling, tenderness or signs of injury. Normal range of motion. Cervical back: Normal range of motion. No rigidity. No muscular tenderness. Skin:     General: Skin is warm and dry. Capillary Refill: Capillary refill takes less than 2 seconds. Neurological:      General: No focal deficit present. Mental Status: She is alert. She is disoriented. Comments: On neurological exam patient alert and oriented person place but not time or condition. Muscle strength 5 out of 5 to bilateral upper and lower extremities, sensation intact to light touch. Nipples equal and reactive to light, no ataxia noted with finger-to-nose or heel-to-shin. Psychiatric:         Mood and Affect: Mood normal.         Behavior: Behavior normal.          Procedures       MDM  Number of Diagnoses or Management Options  Acute cystitis without hematuria  Altered mental status, unspecified altered mental status type  Community acquired pneumonia of right lower lobe of lung  Diagnosis management comments: Patient is a 45-year-old female no significant past medical history. Patient presents with chief complaint of altered mental status. Vital signs stable presentations of her elevated blood pressure. On physical exam patient alert and oriented to person and place but not time or condition. Heart regular rate and rhythm, lungs clear to auscultation bilaterally, abdomen soft nontender no rebound or guarding. Patient is neurologically intact no focal deficits, strength 5 out of 5 to bilateral upper and lower extremities, sensation is intact to light touch. EKG obtained demonstrate no acute ischemic changes.   Laboratory work obtained CBC demonstrated mild leukocytosis of 13.3, CMP obtained demonstrated mildly elevated creatinine 1.2, lactic acid 1.0, urine drug screen negative, blood cultures were obtained and are pending. Urinalysis was obtained and was indicative of infection. Troponin initially 21 and repeat 22.  proBNP 565. INR 1.0, APTT 22.5, CK 42. Ammonia was obtained was 18. Chest x-ray obtained demonstrated right lower lobe infiltrate. CT scan of the head demonstrated no acute abnormalities. Findings consistent with altered mental status likely secondary to pneumonia and UTI. Patient given Levaquin. Due to patient not being at mental baseline decision made to admit patient. Case discussed with hospitalist who agreed to admit patient. Plan of care discussed with patient including admission, all questions were answered, patient was in agreement plan of care and admitted to the hospital stable condition. Amount and/or Complexity of Data Reviewed  Clinical lab tests: ordered and reviewed  Tests in the radiology section of CPT®: ordered and reviewed    Risk of Complications, Morbidity, and/or Mortality  Presenting problems: moderate  Diagnostic procedures: moderate  Management options: moderate    Patient Progress  Patient progress: stable             --------------------------------------------- PAST HISTORY ---------------------------------------------  Past Medical History:  has a past medical history of Nephritis. Past Surgical History:  has a past surgical history that includes Hysterectomy and Tonsillectomy. Social History:  reports that she has quit smoking. She does not have any smokeless tobacco history on file. She reports previous alcohol use. She reports that she does not use drugs. Family History: family history is not on file. The patients home medications have been reviewed.     Allergies: Pcn [penicillins]    -------------------------------------------------- RESULTS RBC, UA NONE 0 - 2 /HPF    Epithelial Cells, UA FEW /HPF    Bacteria, UA MANY (A) None Seen /HPF   Protime-INR   Result Value Ref Range    Protime 10.8 9.3 - 12.4 sec    INR 1.0    APTT   Result Value Ref Range    aPTT 22.5 (L) 24.5 - 35.1 sec   Lactic Acid   Result Value Ref Range    Lactic Acid 0.7 0.5 - 2.2 mmol/L   CK   Result Value Ref Range    Total CK 42 20 - 180 U/L   URINE DRUG SCREEN   Result Value Ref Range    Amphetamine Screen, Urine NOT DETECTED Negative <1000 ng/mL    Barbiturate Screen, Ur NOT DETECTED Negative < 200 ng/mL    Benzodiazepine Screen, Urine NOT DETECTED Negative < 200 ng/mL    Cannabinoid Scrn, Ur NOT DETECTED Negative < 50ng/mL    Cocaine Metabolite Screen, Urine NOT DETECTED Negative < 300 ng/mL    Opiate Scrn, Ur NOT DETECTED Negative < 300ng/mL    PCP Screen, Urine NOT DETECTED Negative < 25 ng/mL    Methadone Screen, Urine NOT DETECTED Negative <300 ng/mL    Oxycodone Urine NOT DETECTED Negative <100 ng/mL    FENTANYL SCREEN, URINE NOT DETECTED Negative <1 ng/mL    Drug Screen Comment: see below    Serum Drug Screen   Result Value Ref Range    Ethanol Lvl <10 mg/dL    Acetaminophen Level <5.0 (L) 10.0 - 10.6 mcg/mL    Salicylate, Serum <4.7 0.0 - 30.0 mg/dL    TCA Scrn NEGATIVE Cutoff:300 ng/mL   Ammonia   Result Value Ref Range    Ammonia 18.0 11.0 - 51.0 umol/L   SPECIMEN REJECTION   Result Value Ref Range    Rejected Test CBCWD     Reason for Rejection see below    CBC with Auto Differential   Result Value Ref Range    WBC 13.4 (H) 4.5 - 11.5 E9/L    RBC 4.72 3.50 - 5.50 E12/L    Hemoglobin 14.4 11.5 - 15.5 g/dL    Hematocrit 43.1 34.0 - 48.0 %    MCV 91.3 80.0 - 99.9 fL    MCH 30.5 26.0 - 35.0 pg    MCHC 33.4 32.0 - 34.5 %    RDW 12.8 11.5 - 15.0 fL    Platelets 413 889 - 072 E9/L    MPV 8.8 7.0 - 12.0 fL    Neutrophils % 78.4 43.0 - 80.0 %    Immature Granulocytes % 0.5 0.0 - 5.0 %    Lymphocytes % 13.1 (L) 20.0 - 42.0 %    Monocytes % 7.1 2.0 - 12.0 %    Eosinophils % 0.5 0.0 - 6.0 %    Basophils % 0.4 0.0 - 2.0 %    Neutrophils Absolute 10.50 (H) 1.80 - 7.30 E9/L    Immature Granulocytes # 0.07 E9/L    Lymphocytes Absolute 1.75 1.50 - 4.00 E9/L    Monocytes Absolute 0.95 0.10 - 0.95 E9/L    Eosinophils Absolute 0.07 0.05 - 0.50 E9/L    Basophils Absolute 0.05 0.00 - 0.20 E9/L   Troponin   Result Value Ref Range    Troponin, High Sensitivity 22 (H) 0 - 9 ng/L   Comprehensive Metabolic Panel w/ Reflex to MG   Result Value Ref Range    Sodium 135 132 - 146 mmol/L    Potassium reflex Magnesium 4.1 3.5 - 5.0 mmol/L    Chloride 101 98 - 107 mmol/L    CO2 20 (L) 22 - 29 mmol/L    Anion Gap 14 7 - 16 mmol/L    Glucose 124 (H) 74 - 99 mg/dL    BUN 17 6 - 23 mg/dL    CREATININE 1.2 (H) 0.5 - 1.0 mg/dL    GFR Non-African American 44 >=60 mL/min/1.73    GFR African American 54     Calcium 9.2 8.6 - 10.2 mg/dL    Total Protein 6.9 6.4 - 8.3 g/dL    Albumin 4.1 3.5 - 5.2 g/dL    Total Bilirubin 0.6 0.0 - 1.2 mg/dL    Alkaline Phosphatase 75 35 - 104 U/L    ALT 9 0 - 32 U/L    AST 15 0 - 31 U/L   Procalcitonin   Result Value Ref Range    Procalcitonin 0.06 0.00 - 0.08 ng/mL   Lactic Acid   Result Value Ref Range    Lactic Acid 1.0 0.5 - 2.2 mmol/L   CBC with Auto Differential   Result Value Ref Range    WBC 13.3 (H) 4.5 - 11.5 E9/L    RBC 4.63 3.50 - 5.50 E12/L    Hemoglobin 14.0 11.5 - 15.5 g/dL    Hematocrit 42.0 34.0 - 48.0 %    MCV 90.7 80.0 - 99.9 fL    MCH 30.2 26.0 - 35.0 pg    MCHC 33.3 32.0 - 34.5 %    RDW 13.0 11.5 - 15.0 fL    Platelets 470 488 - 910 E9/L    MPV 8.9 7.0 - 12.0 fL    Neutrophils % 77.1 43.0 - 80.0 %    Immature Granulocytes % 0.5 0.0 - 5.0 %    Lymphocytes % 13.0 (L) 20.0 - 42.0 %    Monocytes % 8.0 2.0 - 12.0 %    Eosinophils % 1.0 0.0 - 6.0 %    Basophils % 0.4 0.0 - 2.0 %    Neutrophils Absolute 10.24 (H) 1.80 - 7.30 E9/L    Immature Granulocytes # 0.06 E9/L    Lymphocytes Absolute 1.73 1.50 - 4.00 E9/L    Monocytes Absolute 1.06 (H) 0.10 - 0.95 E9/L    Eosinophils Absolute 0.13 0.05 - 0.50 E9/L    Basophils Absolute 0.05 0.00 - 0.20 E9/L   Comprehensive Metabolic Panel w/ Reflex to MG   Result Value Ref Range    Sodium 139 132 - 146 mmol/L    Potassium reflex Magnesium 3.7 3.5 - 5.0 mmol/L    Chloride 100 98 - 107 mmol/L    CO2 22 22 - 29 mmol/L    Anion Gap 17 (H) 7 - 16 mmol/L    Glucose 116 (H) 74 - 99 mg/dL    BUN 18 6 - 23 mg/dL    CREATININE 1.3 (H) 0.5 - 1.0 mg/dL    GFR Non-African American 41 >=60 mL/min/1.73    GFR African American 49     Calcium 9.5 8.6 - 10.2 mg/dL    Total Protein 6.9 6.4 - 8.3 g/dL    Albumin 4.1 3.5 - 5.2 g/dL    Total Bilirubin 0.6 0.0 - 1.2 mg/dL    Alkaline Phosphatase 66 35 - 104 U/L    ALT 8 0 - 32 U/L    AST 14 0 - 31 U/L   CBC with Auto Differential   Result Value Ref Range    WBC 20.6 (H) 4.5 - 11.5 E9/L    RBC 4.34 3.50 - 5.50 E12/L    Hemoglobin 13.2 11.5 - 15.5 g/dL    Hematocrit 40.0 34.0 - 48.0 %    MCV 92.2 80.0 - 99.9 fL    MCH 30.4 26.0 - 35.0 pg    MCHC 33.0 32.0 - 34.5 %    RDW 12.9 11.5 - 15.0 fL    Platelets 458 070 - 533 E9/L    MPV 9.1 7.0 - 12.0 fL    Neutrophils % 85.3 (H) 43.0 - 80.0 %    Immature Granulocytes % 0.6 0.0 - 5.0 %    Lymphocytes % 6.9 (L) 20.0 - 42.0 %    Monocytes % 7.0 2.0 - 12.0 %    Eosinophils % 0.0 0.0 - 6.0 %    Basophils % 0.2 0.0 - 2.0 %    Neutrophils Absolute 17.53 (H) 1.80 - 7.30 E9/L    Immature Granulocytes # 0.13 E9/L    Lymphocytes Absolute 1.43 (L) 1.50 - 4.00 E9/L    Monocytes Absolute 1.44 (H) 0.10 - 0.95 E9/L    Eosinophils Absolute 0.01 (L) 0.05 - 0.50 E9/L    Basophils Absolute 0.05 0.00 - 0.20 E9/L   Blood ID, Molecular   Result Value Ref Range    Bottle Type Aerobic     Source of Blood Culture No site given     Order Number [id number]     Acinetobacter calcoac baumannii complex by PCR Not Detected Not Detected    Bacteroides fragilis by PCR Not Detected Not Detected    Enterobacteriaceae by PCR Not Detected Not Detected    Enterobacter cloacae complex by PCR Not Detected Not Detected Enterococcus faecalis by PCR Not Detected Not Detected    Enterococcus faecium by PCR Not Detected Not Detected    Escherichia coli by PCR Not Detected Not Detected    Haemophilus Influenzae by PCR Not Detected Not Detected    Klebsiella aerogenes by PCR Not Detected Not Detected    Klebsiella oxytoca by PCR Not Detected Not Detected    Klebsiella pneumoniae group by PCR Not Detected Not Detected    Listeria monocytogenes by PCR Not Detected Not Detected    Neisseria meningitidis by PCR Not Detected Not Detected    Proteus species by PCR Not Detected Not Detected    Pseudomonas aeruginosa by PCR Not Detected Not Detected    Salmonella species by PCR Not Detected Not Detected    Streptococcus agalactiae by PCR Not Detected Not Detected    Staphylococcus aureus by PCR Not Detected Not Detected    Staphylococcus epidermidis by PCR Not Detected Not Detected    Staphylococcus lugdunensis by PCR Not Detected Not Detected    Staphylococcus species by PCR DETECTED (AA) Not Detected    Serratia marcescens by PCR Not Detected Not Detected    Streptococcus pneumoniae by PCR Not Detected Not Detected    Streptococcus pyogenes  by PCR Not Detected Not Detected    Streptococcus species by PCR Not Detected Not Detected    Stenotrophomonas maltophilia by PCR Not Detected Not Detected    Candida albicans by PCR Not Detected Not Detected    Candida auris by PCR Not Detected Not Detected    Candida glabrata by PCR Not Detected Not Detected    Candida krusei by PCR Not Detected Not Detected    Candida parapsilosis by PCR Not Detected Not Detected    Candida tropicalis by PCR Not Detected Not Detected    Cryptococcus neoformans/gattii by PCR Not Detected Not Detected   Procalcitonin   Result Value Ref Range    Procalcitonin 0.06 0.00 - 0.08 ng/mL   CBC with Auto Differential   Result Value Ref Range    WBC 11.9 (H) 4.5 - 11.5 E9/L    RBC 4.85 3.50 - 5.50 E12/L    Hemoglobin 14.8 11.5 - 15.5 g/dL    Hematocrit 45.7 34.0 - 48.0 % MCV 94.2 80.0 - 99.9 fL    MCH 30.5 26.0 - 35.0 pg    MCHC 32.4 32.0 - 34.5 %    RDW 13.1 11.5 - 15.0 fL    Platelets 196 227 - 041 E9/L    MPV 8.7 7.0 - 12.0 fL    Neutrophils % 82.3 (H) 43.0 - 80.0 %    Immature Granulocytes % 0.6 0.0 - 5.0 %    Lymphocytes % 10.1 (L) 20.0 - 42.0 %    Monocytes % 6.4 2.0 - 12.0 %    Eosinophils % 0.2 0.0 - 6.0 %    Basophils % 0.4 0.0 - 2.0 %    Neutrophils Absolute 9.83 (H) 1.80 - 7.30 E9/L    Immature Granulocytes # 0.07 E9/L    Lymphocytes Absolute 1.20 (L) 1.50 - 4.00 E9/L    Monocytes Absolute 0.77 0.10 - 0.95 E9/L    Eosinophils Absolute 0.02 (L) 0.05 - 0.50 E9/L    Basophils Absolute 0.05 0.00 - 0.20 E9/L   POCT Glucose   Result Value Ref Range    Glucose 103 mg/dL    QC OK? ok    POCT Glucose   Result Value Ref Range    Meter Glucose 103 (H) 74 - 99 mg/dL   EKG 12 Lead   Result Value Ref Range    Ventricular Rate 83 BPM    Atrial Rate 83 BPM    P-R Interval 164 ms    QRS Duration 80 ms    Q-T Interval 404 ms    QTc Calculation (Bazett) 474 ms    P Axis 42 degrees    R Axis 5 degrees    T Axis 107 degrees       RADIOLOGY:  CT Head WO Contrast   Final Result   Generalized decreased attenuation throughout white matter of both hemispheres   as well as cerebellum. Findings could suggest nonspecific encephalopathy. MRI brain recommended for further evaluation. XR CHEST PORTABLE   Final Result   Right lower lung filtrates. MRI BRAIN WO CONTRAST    (Results Pending)       EKG: This EKG is signed and interpreted by me. Rate: 83  Rhythm: Sinus  Interpretation: EKG obtained demonstrate normal sinus rhythm, rate 83, left axis, left ventricular hypertrophy noted, , no acute ST segment changes.   Comparison: no previous EKG      ------------------------- NURSING NOTES AND VITALS REVIEWED ---------------------------  Date / Time Roomed:  6/6/2022  6:08 PM  ED Bed Assignment:  [id number]    The nursing notes within the ED encounter and vital signs as below have been reviewed. Patient Vitals for the past 24 hrs:   BP Temp Temp src Pulse Resp SpO2   06/09/22 0535 (!) 191/110 97.3 °F (36.3 °C) Temporal 89 16 --   06/09/22 0315 (!) 190/94 -- -- 86 -- --   06/09/22 0015 (!) 208/108 97.2 °F (36.2 °C) Temporal 81 16 96 %   06/08/22 1145 139/77 -- -- 85 -- --   06/08/22 1015 (!) 150/102 -- -- -- -- --       Oxygen Saturation Interpretation: Normal    ------------------------------------------ PROGRESS NOTES ------------------------------------------  Re-evaluation(s):  Time: 2000 Patients symptoms show no change  Repeat physical examination is not changed    Counseling:  I have spoken with the patient and discussed todays results, in addition to providing specific details for the plan of care and counseling regarding the diagnosis and prognosis. Their questions are answered at this time and they are agreeable with the plan of admission.    --------------------------------- ADDITIONAL PROVIDER NOTES ---------------------------------  Consultations:  . Spoke with [facility]. Discussed case. They will admit the patient. This patient's ED course included: a personal history and physicial examination and re-evaluation prior to disposition    This patient has remained hemodynamically stable during their ED course. Diagnosis:  1. Altered mental status, unspecified altered mental status type    2. Acute cystitis without hematuria    3. Community acquired pneumonia of right lower lobe of lung        Disposition:  Patient's disposition: Admit to telemetry  Patient's condition is stable. Patient was seen and evaluated by myself and my attending  Assessment and Plan discussed with attending provider, please see attestation for final plan of care.      DO Gayathri Dumont,   Resident  06/09/22 4680

## 2022-06-07 NOTE — CARE COORDINATION
Met with the pt. She is oriented x 1. She does not know where she is and she thinks she is 34 yrs old. Spoke to Linda Celestin, [phone]. The pt had been staying her Lizeth's brother, \"Buster\". He has a handicapped son and can no longer allow the pt to live with him. She has recently been inappropriate in the home ( ie sitting on the couch naked, etc.). Up until 2 days ago, she was able to carry on a conversation, but was having back pain and was having difficulty with ambulation. According to Edmundo Patricio, the pt had sold her home in [facility] and is using the money to buy a home in the area. She receives $981 in [employer] benefits monthly. She has a sister in [address] [place] [address], with whom she is estranged. The pt is on IV antibiotics. Will follow.  El Patricia RN

## 2022-06-07 NOTE — ED NOTES
PT presents confused and cant remember what she needs when she calls on nurse.      [facility]  06/07/22 2725

## 2022-06-07 NOTE — PROGRESS NOTES
Pharmacy Consultation Note  (Antibiotic Dosing and Monitoring)    Initial consult date: 6/7/22  Consulting physician/provider: GEORGIE Escudero - CNP   Drug: Vancomycin  Indication: CAP (7 days)    Age/  Gender Height Weight IBW  Allergy Information   71 y.o./female         Patient weight not recorded   Pcn [penicillins]      Renal Function:  Recent Labs     06/06/22  1911 06/07/22  0717   BUN 18 17   CREATININE 1.2* 1.2*       Intake/Output Summary (Last 24 hours) at 6/7/2022 1026  Last data filed at 6/7/2022 0932  Gross per 24 hour   Intake --   Output 350 ml   Net -350 ml       Vancomycin Monitoring:  Trough:  No results for input(s): VANCOTROUGH in the last 72 hours. Random:  No results for input(s): VANCORANDOM in the last 72 hours. Vancomycin Administration Times:  Recent vancomycin administrations      No vancomycin IV orders with administrations found. Orders not given:          vancomycin (VANCOCIN) 1,750 mg in dextrose 5 % 500 mL IVPB    vancomycin (VANCOCIN) 1,250 mg in dextrose 5 % 250 mL IVPB                Assessment:  · Patient is a 73 y.o. female who has been initiated on vancomycin  · CrCl cannot be calculated (Unknown ideal weight. ).   · To dose vancomycin, pharmacy will be utilizing Drawn to Scale calculation software for goal AUC/MALU 400-600 mg/L-hr    Plan:  · Will initiate vancomycin 1750 mg (~20 mg/kg) IV x 1 followed by 1250 mg (~15 mg/kg) every 12 hours  · Will check vancomycin levels when appropriate  · Will continue to monitor renal function   · Clinical pharmacy to follow      Melissa Jiménez PharmD  Pharmacy Resident  P: [id number]  6/7/2022 10:26 AM

## 2022-06-07 NOTE — PLAN OF CARE
PERFECT SERVED DR SHAFFER REGARDING PTS ELEVATED B/P 210/110 AND THAT PT HAS HAD A LARGE EMESIS X2.  AND THAT SHE WAS MEDICATED WITH HYDRALAZINE AT 1495 THIS AM.WAIT NEW ORDERS

## 2022-06-07 NOTE — ED NOTES
PT had paper with contact info- [facility]- Izaguirre Inder [address] [place] [address], [address] [facility]  06/07/22 1524

## 2022-06-07 NOTE — PROGRESS NOTES
Clinical Pharmacy Note    Vancomycin was stopped and the Pharmacy vancomycin consult was canceled by Dr. Hilario Grove (ID). Clinical Pharmacy sign off.     Harshad Shah PharmD  6/7/2022  12:55 PM

## 2022-06-07 NOTE — CONSULTS
[facility] CONSULT NOTE    Reason for Consult: Leukocytosis, concern for pneumonia   Requested by: GEORGIE Gonsales CNP      Chief complaint: Confusion    History Obtained From: EMR and patient     HISTORY OFPRESENT ILLNESS              The patient is a 73 y.o. female with no known medical conditions, presented on 06/06 with confusion. She reports no cough, no shortness of breath. On admission, she was afebrile and hemodynamically stable with leukocytosis of 13,000. Procalcitonin level was not elevated at 0.06 ng/mL. Urinalysis showed pyuria 5-10 WBCs. Chest x-ray showed right lower lung infiltrate. Levofloxacin and vancomycin were started on admission. ID service was subsequently consulted for further recommendations.     Past Medical History  Past Medical History:   Diagnosis Date    Nephritis        Current Facility-Administered Medications   Medication Dose Route Frequency Provider Last Rate Last Admin    levoFLOXacin (LEVAQUIN) 750 MG/150ML infusion 750 mg  750 mg IntraVENous Q24H Reema Ligas, DO   Stopped at 06/07/22 0123    sodium chloride flush 0.9 % injection 10 mL  10 mL IntraVENous 2 times per day Reema Ligas, DO        sodium chloride flush 0.9 % injection 10 mL  10 mL IntraVENous PRN Reema Ligas, DO   10 mL at 06/07/22 0705    0.9 % sodium chloride infusion   IntraVENous PRN Reema Ligas, DO        enoxaparin (LOVENOX) injection 40 mg  40 mg SubCUTAneous Daily Reema Ligas, DO        promethazine (PHENERGAN) tablet 12.5 mg  12.5 mg Oral Q6H PRN Reema Ligas, DO        Or    ondansetron [facility]) injection 4 mg  4 mg IntraVENous Q6H PRN Reema Ligas, DO   4 mg at 06/07/22 0810    polyethylene glycol (GLYCOLAX) packet 17 g  17 g Oral Daily PRN Reema Ligas, DO        acetaminophen (TYLENOL) tablet 650 mg  650 mg Oral Q6H PRN Reema Ligas, DO        Or    acetaminophen (TYLENOL) suppository 650 mg  650 mg Rectal Q6H PRN Reema Ligas, DO        hydrALAZINE (APRESOLINE) injection 10 mg  10 mg IntraVENous Q4H PRN Raghu Hurst DO   10 mg at 06/07/22 0705    vancomycin (VANCOCIN) 1,750 mg in dextrose 5 % 500 mL IVPB  1,750 mg IntraVENous Once GEORGIE Diaz CNP        Followed by   [employer] vancomycin (VANCOCIN) 1,250 mg in dextrose 5 % 250 mL IVPB  1,250 mg IntraVENous Q12H GeniGEORGIE Pena CNP           Allergies   Allergen Reactions    Pcn [Penicillins]      \"I don't breathe. \"       Surgical History  Past Surgical History:   Procedure Laterality Date    HYSTERECTOMY      TONSILLECTOMY          Social History  Social History     Socioeconomic History    Marital status: Unknown   Tobacco Use    Smoking status: Former Smoker    Smokeless tobacco: Not on file   Vaping Use    Vaping Use: Never used   Substance and Sexual Activity    Alcohol use: Not Currently    Drug use: Never         Family Medical History  History reviewed. No pertinent family history. Review of Systems:  Constitutional: No fever, no chills  Eyes: No vision changes, no retroorbital pain  ENT: No hearing changes, no ear pain  Respiratory: No cough, no dyspnea  Cardiovascular: No chest pain, no palpitations  Gastrointestinal: No abdominal pain, no diarrhea  Genitourinary: Has dysuria, no hematuria  Integumentary: No rash, no itching  Musculoskeletal: No muscle pain, no joint pain  Neurologic: No headache, no numbness in extremities    Physical Examination:  Vitals:    06/06/22 2000 06/07/22 0145 06/07/22 0545 06/07/22 0700   BP:  (!) 178/98 (!) 168/82 (!) 198/113   Pulse:  88 84 (!) 109   Resp:  17 18 22   Temp:  98.1 °F (36.7 °C) 97.6 °F (36.4 °C) 98.1 °F (36.7 °C)   TempSrc:    Temporal   SpO2: 97%  95% 95%     Constitutional: Alert, not in distress  Eyes: Sclerae anicteric, no conjunctival erythema  ENT: No buccal lesion, no pharyngeal exudates  Neck: No nuchal rigidity, no cervical adenopathy  Lungs: Clear breath sounds, no crackles, no wheezes  Heart: Regular rate and rhythm, no murmurs  Abdomen:  Bowel sounds present, soft, nontender. Genitourinary: External genitalia erythematous and moist with urine odor  Skin: Warm and dry, no active dermatoses  Musculoskeletal: No joint erythema, no joint swelling    Labs, imaging, and medical records/notes were personally reviewed. Assessment:  Leukocytosis, etiology unclear, doubt infectious in etiology  Right lung infiltrate, suspect artifact vs viral pneumonia. Clinical presentation is not consistent with bacterial pneumonia  Intertrigo, suspect aggravated by possible urinary incontinence    Plan:  Stop antibiotics. Monitor clinically off antibiotics for now. Check urine Streptococcus pneumoniae and Legionella antigens. Apply miconazole powder to inguinal area. Keep genital area clean and dry. Thank you for involving me in the care of [facility]. I will continue to follow. Please do not hesitate to call for any questions or concerns.     Electronically signed by Idris Perry MD on 6/7/2022 at 10:48 AM

## 2022-06-07 NOTE — ED NOTES
PT refused straight cath, provided PT with oral fluids to encourage voiding.      Anne-Marie Mitchell RN  06/06/22 5003

## 2022-06-07 NOTE — PLAN OF CARE
PT IS VERY CONFUSED AND WAS FOUND SITTING ON THE SIDE OF THE BED NAKED. INCONTINENT. CANNOT VERBALIZE TIME PLACE SITUATION. BED ALARM ON.  Julio Cesar Ramirez REQUESTED

## 2022-06-07 NOTE — H&P
Hospitalist History & Physical      PCP: No primary care provider on file. Date of Service: Pt seen/examined on 6/7/2022     Chief Complaint:  had concerns including Altered Mental Status (male friend called EMS for AMS for 2 days, patient unsure why she is here). History Of Present Illness:    Ms. Shun Hartman, a 73y.o. year old female  who  has a past medical history of Nephritis. Patient presents to the emergency department with altered mental status. Patient's friend reports that she has been altered for about 2 days. Patient himself is not sure why she is here. Vital signs reveal the patient be hypertensive initially with pressures 220/200. Current pressures 194/130. Laboratory studies notable for creatinine 1.2, glucose 117, proBNP 565, troponin 22, WBC 13.4. Urinalysis shows positive nitrites, 5-10 WBCs and many bacteria. Chest x-ray shows right lower lung infiltrates. CT head shows generalized decreased attenuation throughout white matter of both hemispheres as well as cerebellum. Findings could suggest nonspecific encephalopathy. MRI of the brain recommended for further evaluation. Past Medical History:   Diagnosis Date    Nephritis        Past Surgical History:   Procedure Laterality Date    HYSTERECTOMY      TONSILLECTOMY         Prior to Admission medications    Medication Sig Start Date End Date Taking? Authorizing Provider   ibuprofen (ADVIL;MOTRIN) 600 MG tablet Take 1 tablet by mouth 3 times daily as needed for Pain 2/22/22   Cyn Banuelos MD   acetaminophen (TYLENOL) 500 MG tablet Take 1 tablet by mouth 4 times daily as needed for Pain 2/22/22   Cyn Banuelos MD         Allergies:  Pcn [penicillins]    Social History:    TOBACCO:   reports that she has quit smoking. She does not have any smokeless tobacco history on file. ETOH:   reports previous alcohol use. Family History:    Reviewed in detail and negative for DM, CAD, Cancer, CVA.  Positive as follows\"  History reviewed. No pertinent family history. REVIEW OF SYSTEMS:   Pertinent positives as noted in the HPI. All other systems reviewed and negative. PHYSICAL EXAM:  BP (!) 184/130   Pulse 89   Temp 98.1 °F (36.7 °C) (Oral)   Resp 14   SpO2 97%   General appearance: No apparent distress  HEENT: Normal cephalic, atraumatic without obvious deformity  Neck: Supple, with full range of motion. No jugular venous distention. Trachea midline. Respiratory: Clear to auscultation bilaterally  Cardiovascular: Regular rate and rhythm  Abdomen: Soft, nontender, nondistended  Musculoskeletal: No clubbing, cyanosis, edema of bilateral lower extremities. Brisk capillary refill. Skin: Normal skin color. No rashes or lesions. Neurologic:  Neurovascularly intact without any focal sensory/motor deficits. Cranial nerves: II-XII intact, grossly non-focal.    CBC:   Recent Labs     06/06/22 2019   WBC 13.4*   RBC 4.72   HGB 14.4   HCT 43.1   MCV 91.3   RDW 12.8        BMP:   Recent Labs     06/06/22 1911      K 4.1      CO2 23   BUN 18   CREATININE 1.2*     LFT:  Recent Labs     06/06/22 1911   PROT 7.0   ALKPHOS 74   ALT 9   AST 14   BILITOT 0.6     CE:  Recent Labs     06/06/22 1911   CKTOTAL 42     PT/INR:   Recent Labs     06/06/22 1911   INR 1.0   APTT 22.5*     BNP: No results for input(s): BNP in the last 72 hours.   ESR: No results found for: SEDRATE  CRP: No results found for: CRP  D Dimer: No results found for: DDIMER   Folate and B12: No results found for: [id number], No results found for: FOLATE  Lactic Acid:   Lab Results   Component Value Date    LACTA 0.7 06/06/2022     Thyroid Studies: No results found for: TSH, C3KCMET, D0AWXSW, THYROIDAB    Oupatient labs:  Lab Results   Component Value Date    INR 1.0 06/06/2022       Urinalysis:    Lab Results   Component Value Date    NITRU POSITIVE 06/06/2022    WBCUA 5-10 06/06/2022    BACTERIA MANY 06/06/2022    RBCUA NONE 06/06/2022    BLOODU Negative 06/06/2022    SPECGRAV 1.025 06/06/2022    GLUCOSEU Negative 06/06/2022       Imaging:  CT Head WO Contrast    Result Date: 6/6/2022  EXAMINATION: CT OF THE HEAD WITHOUT CONTRAST  6/6/2022 8:41 pm TECHNIQUE: CT of the head was performed without the administration of intravenous contrast. Automated exposure control, iterative reconstruction, and/or weight based adjustment of the mA/kV was utilized to reduce the radiation dose to as low as reasonably achievable. COMPARISON: None. HISTORY: ORDERING SYSTEM PROVIDED HISTORY: [facility] [address] . TECHNOLOGIST PROVIDED HISTORY: Reason for exam:->AMS Has a \"code stroke\" or \"stroke alert\" been called? ->No Decision Support Exception - unselect if not a suspected or confirmed emergency medical condition->Emergency Medical Condition (MA) What reading provider will be dictating this exam?->CRC FINDINGS: Generalized decreased attenuation throughout white matter of both hemispheres and cerebellum. No acute intracranial hemorrhage. No abnormal extra-axial fluid collections. No hydrocephalus. Basilar cisterns are patent. Paranasal sinuses and mastoid air cells are clear     Generalized decreased attenuation throughout white matter of both hemispheres as well as cerebellum. Findings could suggest nonspecific encephalopathy. MRI brain recommended for further evaluation. XR CHEST PORTABLE    Result Date: 6/6/2022  EXAMINATION: ONE XRAY VIEW OF THE CHEST 6/6/2022 6:40 pm COMPARISON: None. HISTORY: ORDERING SYSTEM PROVIDED HISTORY: [facility] [address] TECHNOLOGIST PROVIDED HISTORY: Reason for exam:->AMS What reading provider will be dictating this exam?->CRC FINDINGS: There are patchy opacities in the right lower lung. The left lung is clear. The heart is prominent in size. There is no pneumothorax. The costophrenic angles are clear bilaterally. Right lower lung filtrates.        ASSESSMENT:  -Right lower lobe pneumonia  -Altered mental status  -Leukocytosis  -Poorly controlled hypertension  -Acute renal failure  -Abnormal CT head      PLAN:  -Admit to medicine  -Levaquin 750 mg IV daily  -Respiratory cultures  -Blood cultures x2  -Urine culture  -MRI of the brain without contrast  -PT/OT  -Monitor renal function        Diet: No diet orders on file  Code Status: No Order  Surrogate decision maker confirmed with patient:   No emergency contact information on file. DVT Prophylaxis: []Lovenox []Heparin []PCD [] [address] []Encouraged ambulation  Disposition: []Med/Surg [] Intermediate [] ICU/CCU  Admit status: [] Observation [] Inpatient     +++++++++++++++++++++++++++++++++++++++++++++++++  Sonia Dk, DO  +++++++++++++++++++++++++++++++++++++++++++++++++  NOTE: This report was transcribed using voice recognition software. Every effort was made to ensure accuracy; however, inadvertent computerized transcription errors may be present.

## 2022-06-07 NOTE — PROGRESS NOTES
Patient admitted after midnight  ID consulted and following for leukocytosis, right lower lung infiltrates, and 5-10 wbc noted on urinalysis. Patient continues to be alert to self only. Unable to state name, birthday, current year, location, or any prompted questions. Patient hypertensive 199/106 at time of assessment.   Hydralazine 10mg IV Q4H PRN  Awaiting MRI

## 2022-06-08 LAB
ACINETOBACTER CALCOAC BAUMANNII COMPLEX BY PCR: NOT DETECTED
ALBUMIN SERPL-MCNC: 4.1 G/DL (ref 3.5–5.2)
ALP BLD-CCNC: 66 U/L (ref 35–104)
ALT SERPL-CCNC: 8 U/L (ref 0–32)
ANION GAP SERPL CALCULATED.3IONS-SCNC: 17 MMOL/L (ref 7–16)
AST SERPL-CCNC: 14 U/L (ref 0–31)
BACTEROIDES FRAGILIS BY PCR: NOT DETECTED
BASOPHILS ABSOLUTE: 0.05 E9/L (ref 0–0.2)
BASOPHILS RELATIVE PERCENT: 0.2 % (ref 0–2)
BILIRUB SERPL-MCNC: 0.6 MG/DL (ref 0–1.2)
BOTTLE TYPE: ABNORMAL
BUN BLDV-MCNC: 18 MG/DL (ref 6–23)
CALCIUM SERPL-MCNC: 9.5 MG/DL (ref 8.6–10.2)
CANDIDA ALBICANS BY PCR: NOT DETECTED
CANDIDA AURIS BY PCR: NOT DETECTED
CANDIDA GLABRATA BY PCR: NOT DETECTED
CANDIDA KRUSEI BY PCR: NOT DETECTED
CANDIDA PARAPSILOSIS BY PCR: NOT DETECTED
CANDIDA TROPICALIS BY PCR: NOT DETECTED
CHLORIDE BLD-SCNC: 100 MMOL/L (ref 98–107)
CO2: 22 MMOL/L (ref 22–29)
CREAT SERPL-MCNC: 1.3 MG/DL (ref 0.5–1)
CRYPTOCOCCUS NEOFORMANS/GATTII BY PCR: NOT DETECTED
EKG ATRIAL RATE: 83 BPM
EKG P AXIS: 42 DEGREES
EKG P-R INTERVAL: 164 MS
EKG Q-T INTERVAL: 404 MS
EKG QRS DURATION: 80 MS
EKG QTC CALCULATION (BAZETT): 474 MS
EKG R AXIS: 5 DEGREES
EKG T AXIS: 107 DEGREES
EKG VENTRICULAR RATE: 83 BPM
ENTEROBACTER CLOACAE COMPLEX BY PCR: NOT DETECTED
ENTEROBACTERALES BY PCR: NOT DETECTED
ENTEROCOCCUS FAECALIS BY PCR: NOT DETECTED
ENTEROCOCCUS FAECIUM BY PCR: NOT DETECTED
EOSINOPHILS ABSOLUTE: 0.01 E9/L (ref 0.05–0.5)
EOSINOPHILS RELATIVE PERCENT: 0 % (ref 0–6)
ESCHERICHIA COLI BY PCR: NOT DETECTED
GFR AFRICAN AMERICAN: 49
GFR NON-AFRICAN AMERICAN: 41 ML/MIN/1.73
GLUCOSE BLD-MCNC: 116 MG/DL (ref 74–99)
HAEMOPHILUS INFLUENZAE BY PCR: NOT DETECTED
HCT VFR BLD CALC: 40 % (ref 34–48)
HEMOGLOBIN: 13.2 G/DL (ref 11.5–15.5)
IMMATURE GRANULOCYTES #: 0.13 E9/L
IMMATURE GRANULOCYTES %: 0.6 % (ref 0–5)
KLEBSIELLA AEROGENES BY PCR: NOT DETECTED
KLEBSIELLA OXYTOCA BY PCR: NOT DETECTED
KLEBSIELLA PNEUMONIAE GROUP BY PCR: NOT DETECTED
LISTERIA MONOCYTOGENES BY PCR: NOT DETECTED
LYMPHOCYTES ABSOLUTE: 1.43 E9/L (ref 1.5–4)
LYMPHOCYTES RELATIVE PERCENT: 6.9 % (ref 20–42)
MCH RBC QN AUTO: 30.4 PG (ref 26–35)
MCHC RBC AUTO-ENTMCNC: 33 % (ref 32–34.5)
MCV RBC AUTO: 92.2 FL (ref 80–99.9)
MONOCYTES ABSOLUTE: 1.44 E9/L (ref 0.1–0.95)
MONOCYTES RELATIVE PERCENT: 7 % (ref 2–12)
NEISSERIA MENINGITIDIS BY PCR: NOT DETECTED
NEUTROPHILS ABSOLUTE: 17.53 E9/L (ref 1.8–7.3)
NEUTROPHILS RELATIVE PERCENT: 85.3 % (ref 43–80)
ORDER NUMBER: ABNORMAL
PDW BLD-RTO: 12.9 FL (ref 11.5–15)
PLATELET # BLD: 291 E9/L (ref 130–450)
PMV BLD AUTO: 9.1 FL (ref 7–12)
POTASSIUM REFLEX MAGNESIUM: 3.7 MMOL/L (ref 3.5–5)
PROCALCITONIN: 0.06 NG/ML (ref 0–0.08)
PROTEUS SPECIES BY PCR: NOT DETECTED
PSEUDOMONAS AERUGINOSA BY PCR: NOT DETECTED
RBC # BLD: 4.34 E12/L (ref 3.5–5.5)
SALMONELLA SPECIES BY PCR: NOT DETECTED
SERRATIA MARCESCENS BY PCR: NOT DETECTED
SODIUM BLD-SCNC: 139 MMOL/L (ref 132–146)
SOURCE OF BLOOD CULTURE: ABNORMAL
STAPHYLOCOCCUS AUREUS BY PCR: NOT DETECTED
STAPHYLOCOCCUS EPIDERMIDIS BY PCR: NOT DETECTED
STAPHYLOCOCCUS LUGDUNENSIS BY PCR: NOT DETECTED
STAPHYLOCOCCUS SPECIES BY PCR: DETECTED
STENOTROPHOMONAS MALTOPHILIA BY PCR: NOT DETECTED
STREPTOCOCCUS AGALACTIAE BY PCR: NOT DETECTED
STREPTOCOCCUS PNEUMONIAE BY PCR: NOT DETECTED
STREPTOCOCCUS PYOGENES  BY PCR: NOT DETECTED
STREPTOCOCCUS SPECIES BY PCR: NOT DETECTED
TOTAL PROTEIN: 6.9 G/DL (ref 6.4–8.3)
WBC # BLD: 20.6 E9/L (ref 4.5–11.5)

## 2022-06-08 PROCEDURE — 6370000000 HC RX 637 (ALT 250 FOR IP): Performed by: STUDENT IN AN ORGANIZED HEALTH CARE EDUCATION/TRAINING PROGRAM

## 2022-06-08 PROCEDURE — 1200000000 HC SEMI PRIVATE

## 2022-06-08 PROCEDURE — 97530 THERAPEUTIC ACTIVITIES: CPT

## 2022-06-08 PROCEDURE — 80053 COMPREHEN METABOLIC PANEL: CPT

## 2022-06-08 PROCEDURE — 85025 COMPLETE CBC W/AUTO DIFF WBC: CPT

## 2022-06-08 PROCEDURE — 6360000002 HC RX W HCPCS: Performed by: FAMILY MEDICINE

## 2022-06-08 PROCEDURE — 97161 PT EVAL LOW COMPLEX 20 MIN: CPT

## 2022-06-08 PROCEDURE — 2580000003 HC RX 258: Performed by: FAMILY MEDICINE

## 2022-06-08 PROCEDURE — 87040 BLOOD CULTURE FOR BACTERIA: CPT

## 2022-06-08 PROCEDURE — 84145 PROCALCITONIN (PCT): CPT

## 2022-06-08 PROCEDURE — 36415 COLL VENOUS BLD VENIPUNCTURE: CPT

## 2022-06-08 RX ORDER — HYDROCHLOROTHIAZIDE 25 MG/1
25 TABLET ORAL DAILY
Status: DISCONTINUED | OUTPATIENT
Start: 2022-06-08 | End: 2022-06-09

## 2022-06-08 RX ORDER — ENOXAPARIN SODIUM 100 MG/ML
30 INJECTION SUBCUTANEOUS 2 TIMES DAILY
Status: DISCONTINUED | OUTPATIENT
Start: 2022-06-08 | End: 2022-06-13 | Stop reason: HOSPADM

## 2022-06-08 RX ADMIN — ENOXAPARIN SODIUM 30 MG: 100 INJECTION SUBCUTANEOUS at 20:58

## 2022-06-08 RX ADMIN — HYDROCHLOROTHIAZIDE 25 MG: 25 TABLET ORAL at 10:32

## 2022-06-08 RX ADMIN — HYDRALAZINE HYDROCHLORIDE 10 MG: 20 INJECTION INTRAMUSCULAR; INTRAVENOUS at 08:43

## 2022-06-08 RX ADMIN — SODIUM CHLORIDE, PRESERVATIVE FREE 10 ML: 5 INJECTION INTRAVENOUS at 08:43

## 2022-06-08 RX ADMIN — ANTI-FUNGAL POWDER MICONAZOLE NITRATE TALC FREE: 1.42 POWDER TOPICAL at 08:42

## 2022-06-08 RX ADMIN — ENOXAPARIN SODIUM 40 MG: 100 INJECTION SUBCUTANEOUS at 08:43

## 2022-06-08 NOTE — PROGRESS NOTES
MRI measured patient and she is too large for in house scanner. Spoke to RN when on floor and relayed that patient would need [place] [facility] if MRI is still needed. Screening also needs completed. Patient AMS when in room would not be able to answer screening herself.

## 2022-06-08 NOTE — PROGRESS NOTES
Physical Therapy  Physical Therapy Initial Assessment     Name: Bertrand Arciniega  : 1950  MRN: [id number]      Date of Service: 2022    Evaluating PT:  Mamie Lopez PT, DPT [id number]    Room #:  9606/0609-T  Diagnosis:  Pneumonia [J18.9]  PMHx/PSHx:  Nephritis   Procedure/Surgery:  None this admssion  Precautions:  Falls, TSM, cognition  Equipment Needs:  TBD    SUBJECTIVE:    Pt is a poor historian at this time. Per chart, pt was living with a friend (?). However, she is unable to return upon DC. PLOF unclear. OBJECTIVE:   Initial Evaluation  Date: 22 Treatment Short Term/ Long Term   Goals   AM-PAC 6 Clicks      Was pt agreeable to Eval/treatment? yes     Does pt have pain? RLE pain, unable to rate     Bed Mobility  Rolling: ModA  Supine to sit: ModA  Sit to supine: SBA  Scooting: ModA  Rolling: Supervision   Supine to sit: Supervision   Sit to supine: Supervision   Scooting: Supervision    Transfers Sit to stand: MaxA  Stand to sit: maxA  Stand pivot: NT  Sit to stand: Jaron  Stand to sit: Jaron  Stand pivot: Jaron with AAD   Ambulation    NT  50 feet with AAD Jaron   Stair negotiation: ascended and descended  NT  4 steps with 1 rail Jaron   ROM BUE:  Defer to OT note  BLE:  WFL     Strength BUE:  Defer to OT note  BLE:  3+/5  Improve by one MMT grade   Balance Sitting EOB:  SBA  Dynamic Standing:  MaxA  Sitting EOB:  Supervision   Dynamic Standing:  Jaron with AAD     Pt is A & O x 1-2 to first and last name and to place.   Sensation:  NT  Edema:  None noted    Vitals:  BP semi-supine 151/102 -- RN aware  Patient education  Pt educated on role of PT, sequencing for safe mobility    Patient response to education:   Pt verbalized understanding Pt demonstrated skill Pt requires further education in this area   yes yes yes     ASSESSMENT:    Conditions Requiring Skilled Therapeutic Intervention:    [x]Decreased strength     [x]Decreased ROM  [x]Decreased functional mobility  [x]Decreased balance [x]Decreased endurance   [x]Decreased posture  []Decreased sensation  []Decreased coordination   []Decreased vision  [x]Decreased safety awareness   []Increased pain       Comments:  Patient semi-supien in bed upon entry and agreeable to PT evaluation. Patient confused during session requiring several repeated simple cues for all mobility. Patient able to complete bed mobility with extra time and assist to trunk. Mild dizziness at EOB reported -- unclear if resolved with rest. Patient able to stand from EOB with forward flexed posture. VCs for upright posture with short-term success followed by immediate return to sitting EOB d/t unsteadiness. Seated lateral scooting completed prior to return to semi-supine in bed at end of session with all needs met. Further mobility deferred d/t limited direction following and elevated BP this date. Patient to benefit from continued skilled PT at NM to improve functional mobility and decrease fall risk. Treatment:  Patient practiced and was instructed in the following treatment:     Bed Mobility: VCs provided for sequencing and safety during mobility. Manual assist provided for completion of task.  Transfer Training: Verbal and tactile cueing provided for sequencing and safety during mobility. Manual assist provided for completion of task   Therapeutic Activities:  Patient sat EOB for approximately 8 minutes during session with close standby assist for safety    Pt's/ family goals   1. None stated    Prognosis is good for reaching above PT goals. Patient and or family understand(s) diagnosis, prognosis, and plan of care.   yes    PHYSICAL THERAPY PLAN OF CARE:    PT POC is established based on physician order and patient diagnosis     Referring provider/PT Order:    06/07/22 0115  PT eval and treat  Start:  06/07/22 0115,   End:  06/07/22 0115,   ONE TIME,   Standing Count:  1 Occurrences,   R         Sushma Adamson DO       Diagnosis:  Pneumonia [J18.9]  Specific instructions for next treatment:  Progress mobility as appropriate    Current Treatment Recommendations:     [x] Strengthening to improve independence with functional mobility   [x] ROM to improve independence with functional mobility   [x] Balance Training to improve static/dynamic balance and to reduce fall risk  [x] Endurance Training to improve activity tolerance during functional mobility   [x] Transfer Training to improve safety and independence with all functional transfers   [x] Gait Training to improve gait mechanics, endurance and assess need for appropriate assistive device  [x] Stair Training in preparation for safe discharge home and/or into the community   [] Positioning to prevent skin breakdown and contractures  [x] Safety and Education Training   [x] Patient/Caregiver Education   [] HEP  [x] Other     PT long term treatment goals are located in above grid    Frequency of treatments: 2-5x/week x 1-2 weeks. Time in  0950  Time out  1015    Total Treatment Time  10 minutes     Evaluation Time includes thorough review of current medical information, gathering information on past medical history/social history and prior level of function, completion of standardized testing/informal observation of tasks, assessment of data and education on plan of care and goals.     CPT codes:  [x] Low Complexity PT evaluation 53256  [] Moderate Complexity PT evaluation 73872  [] High Complexity PT evaluation 96612  [] PT Re-evaluation 35194  [] Gait training 75080 - minutes  [] Manual therapy 93987 - minutes  [x] Therapeutic activities 66293 10 minutes  [] Therapeutic exercises 95409 - minutes  [] Neuromuscular reeducation 31568 - minutes     Henri Mazariegos PT, DPT  [id number]

## 2022-06-08 NOTE — CARE COORDINATION
Met with the pt at the bedside. The pt is alert to person, place and situation. She stated she was 63, but then corrected herself and said she was 73. Per her friend, Mihir Coleman. The pt cannot return home with Buster. Received call from [employer]. The pt receives $981 monthly, but recently sold her home in [place] for $99,000, so she will not qualify for medicaid.  Electronically signed by Kaylin Alfonso RN on 6/8/2022 at 3:14 PM

## 2022-06-08 NOTE — PLAN OF CARE
Problem: Discharge Planning  Goal: Discharge to home or other facility with appropriate resources  6/8/2022 1811 by Vivian Mcgowan RN  Outcome: Progressing  6/8/2022 1206 by Santy Yan RN  Outcome: Progressing     Problem: Skin/Tissue Integrity  Goal: Absence of new skin breakdown  Description: 1. Monitor for areas of redness and/or skin breakdown  2. Assess vascular access sites hourly  3. Every 4-6 hours minimum:  Change oxygen saturation probe site  4. Every 4-6 hours:  If on nasal continuous positive airway pressure, respiratory therapy assess nares and determine need for appliance change or resting period. 6/8/2022 1206 by Santy Yan RN  Outcome: Progressing     Problem: Safety - Adult  Goal: Free from fall injury  6/8/2022 1811 by Vivian Mcgowan RN  Outcome: Progressing  6/8/2022 1206 by Santy Yan RN  Outcome: Progressing     Problem: Confusion  Goal: Confusion, delirium, dementia, or psychosis is improved or at baseline  Description: INTERVENTIONS:  1. Assess for possible contributors to thought disturbance, including medications, impaired vision or hearing, underlying metabolic abnormalities, dehydration, psychiatric diagnoses, and notify attending LIP  2. [place] high risk fall precautions, as indicated  3. Provide frequent short contacts to provide reality reorientation, refocusing and direction  4. Decrease environmental stimuli, including noise as appropriate  5. Monitor and intervene to maintain adequate nutrition, hydration, elimination, sleep and activity  6. If unable to ensure safety without constant attention obtain sitter and review sitter guidelines with assigned personnel  7.  Initiate Psychosocial CNS and Spiritual Care consult, as indicated  6/8/2022 1811 by Vivian Mcgowan RN  Outcome: Progressing  6/8/2022 1206 by Santy Yan RN  Outcome: Progressing

## 2022-06-08 NOTE — PROGRESS NOTES
Hospitalist Progress Note      Synopsis: Patient admitted for altered mental status. Patient's friend reports that she has been altered for about 2 days. Patient himself is not sure why she is here. Vital signs reveal the patient be hypertensive initially with pressures 220/200. Current pressures 194/130. Laboratory studies notable for creatinine 1.2, glucose 117, proBNP 565, troponin 22, WBC 13.4. Urinalysis shows positive nitrites, 5-10 WBCs and many bacteria. Chest x-ray shows right lower lung infiltrates. CT head shows generalized decreased attenuation throughout white matter of both hemispheres as well as cerebellum. Findings could suggest nonspecific encephalopathy. MRI of the brain recommended for further evaluation.       Subjective:  Stable overnight. No issues reported. Patient seen and examined  Records reviewed. Infectious disease following, worsening leukocytosis 13.3 > 20.6, 1 positive blood culture resulted. Repeat blood cultures ordered, procalcitonin ordered  Mental status improving, patient alert to person, place, but not year. Hydrochlorothiazide 25mg PO added for continued hypertension      Temp (24hrs), Av.6 °F (37 °C), Min:98.3 °F (36.8 °C), Max:98.9 °F (37.2 °C)    DIET: ADULT DIET; Regular  CODE: Full Code    Intake/Output Summary (Last 24 hours) at 2022 1345  Last data filed at 2022 2552  Gross per 24 hour   Intake 100 ml   Output 200 ml   Net -100 ml       Review of Systems: All bolded are positive; please see HPI  General:  Fever, chills, diaphoresis, fatigue, malaise, night sweats, weight loss  Psychological:  Anxiety, disorientation, hallucinations. ENT:  Epistaxis, headaches, vertigo, visual changes. Cardiovascular:  Chest pain, irregular heartbeats, palpitations, paroxysmal nocturnal dyspnea. Respiratory:  Shortness of breath, coughing, sputum production, hemoptysis, wheezing, orthopnea.   Gastrointestinal:  Nausea, vomiting, diarrhea, heartburn, constipation, abdominal pain, hematemesis, hematochezia, melena, acholic stools  Genito-Urinary:  Dysuria, urgency, frequency, hematuria  Musculoskeletal:  Joint pain, joint stiffness, joint swelling, muscle pain  Neurology:  Headache, focal neurological deficits, weakness, numbness, paresthesia  Derm:  Rashes, ulcers, excoriations, bruising  Extremities:  Decreased ROM, peripheral edema, mottling    Objective:    /77   Pulse 85   Temp 98.3 °F (36.8 °C) (Temporal)   Resp 18   Ht 6' (1.829 m)   Wt 272 lb (123.4 kg)   SpO2 94%   BMI 36.89 kg/m²     General appearance: No apparent distress, appears stated age and cooperative. HEENT: Conjunctivae/corneas clear. Mucous membranes moist.  Neck: Supple. No JVD. Respiratory:  Diminished breath sounds in bases bilaterally. Normal respiratory effort. Cardiovascular:  RRR. S1, S2 without MRG. PV: Pulses palpable. No edema. Abdomen: Soft, non-tender, non-distended. +BS  Musculoskeletal: No obvious deformities. Skin: Normal skin color. No rashes or lesions. Good turgor. Neurologic:  Grossly non-focal. Awake, alert, following commands.    Psychiatric: Alert and oriented to person and place, not time    Medications:  REVIEWED DAILY    Infusion Medications    sodium chloride       Scheduled Medications    hydroCHLOROthiazide  25 mg Oral Daily    enoxaparin  30 mg SubCUTAneous BID    sodium chloride flush  10 mL IntraVENous 2 times per day    miconazole   Topical BID     PRN Meds: sodium chloride flush, sodium chloride, promethazine **OR** ondansetron, polyethylene glycol, acetaminophen **OR** acetaminophen, hydrALAZINE    Labs:     Recent Labs     06/06/22  2019 06/07/22 0717 06/08/22  0438   WBC 13.4* 13.3* 20.6*   HGB 14.4 14.0 13.2   HCT 43.1 42.0 40.0    274 291       Recent Labs     06/06/22  1911 06/07/22 0717 06/08/22  0438    135 139   K 4.1 4.1 3.7    101 100   CO2 23 20* 22   BUN 18 17 18   CREATININE 1.2* 1.2* 1.3* CALCIUM 9.2 9.2 9.5       Recent Labs     06/06/22 1911 06/07/22  0717 06/08/22  0438   PROT 7.0 6.9 6.9   ALKPHOS 74 75 66   ALT 9 9 8   AST 14 15 14   BILITOT 0.6 0.6 0.6       Recent Labs     06/06/22 1911   INR 1.0       Recent Labs     06/06/22 1911   CKTOTAL 42       Chronic labs:    Lab Results   Component Value Date    INR 1.0 06/06/2022       Radiology: REVIEWED DAILY    Assessment:  -Right lower lobe pneumonia  -Altered mental status  -Leukocytosis  -Poorly controlled hypertension  -Acute renal failure  -Abnormal CT head  Plan:  -Admit to medicine  -Levaquin 750 mg IV daily  -Respiratory cultures  -Blood cultures x2  -Urine culture  -MRI of the brain without contrast  -PT/OT  -Monitor renal function      DVT Prophylaxis [] Lovenox  []  Heparin [] DOAC [] PCDs [] Ambulation    GI Prophylaxis [] PPI  [] H2 Blocker   [] Carafate  [] Diet/Tube Feeds   Level of care [] Med/Surg  [] Intermediate  []  ICU   Diet ADULT DIET; Regular    Family contact []  N/A    [] At bedside  [] Phone call     Discharge Plan: Patient will be reassessed and chart reviewed to determine appropriate date and time for discharge.     +++++++++++++++++++++++++++++++++++++++++++++++++  BHANU Anderson/ [facility] [address]  +++++++++++++++++++++++++++++++++++++++++++++++++  NOTE: This report was transcribed using voice recognition software. Every effort was made to ensure accuracy; however, inadvertent computerized transcription errors may be present.

## 2022-06-09 ENCOUNTER — APPOINTMENT (OUTPATIENT)
Dept: MRI IMAGING | Age: 72
DRG: 078 | End: 2022-06-09
Payer: MEDICARE

## 2022-06-09 LAB
ALBUMIN SERPL-MCNC: 4.2 G/DL (ref 3.5–5.2)
ALP BLD-CCNC: 71 U/L (ref 35–104)
ALT SERPL-CCNC: 11 U/L (ref 0–32)
ANION GAP SERPL CALCULATED.3IONS-SCNC: 17 MMOL/L (ref 7–16)
AST SERPL-CCNC: 18 U/L (ref 0–31)
BASOPHILS ABSOLUTE: 0.05 E9/L (ref 0–0.2)
BASOPHILS RELATIVE PERCENT: 0.4 % (ref 0–2)
BILIRUB SERPL-MCNC: 0.6 MG/DL (ref 0–1.2)
BUN BLDV-MCNC: 19 MG/DL (ref 6–23)
CALCIUM SERPL-MCNC: 9.3 MG/DL (ref 8.6–10.2)
CHLORIDE BLD-SCNC: 98 MMOL/L (ref 98–107)
CHOLESTEROL, TOTAL: 229 MG/DL (ref 0–199)
CO2: 21 MMOL/L (ref 22–29)
CREAT SERPL-MCNC: 1.2 MG/DL (ref 0.5–1)
EOSINOPHILS ABSOLUTE: 0.02 E9/L (ref 0.05–0.5)
EOSINOPHILS RELATIVE PERCENT: 0.2 % (ref 0–6)
GFR AFRICAN AMERICAN: 54
GFR NON-AFRICAN AMERICAN: 44 ML/MIN/1.73
GLUCOSE BLD-MCNC: 114 MG/DL (ref 74–99)
HBA1C MFR BLD: 5.5 % (ref 4–5.6)
HCT VFR BLD CALC: 45.7 % (ref 34–48)
HDLC SERPL-MCNC: 42 MG/DL
HEMOGLOBIN: 14.8 G/DL (ref 11.5–15.5)
IMMATURE GRANULOCYTES #: 0.07 E9/L
IMMATURE GRANULOCYTES %: 0.6 % (ref 0–5)
LDL CHOLESTEROL CALCULATED: 152 MG/DL (ref 0–99)
LYMPHOCYTES ABSOLUTE: 1.2 E9/L (ref 1.5–4)
LYMPHOCYTES RELATIVE PERCENT: 10.1 % (ref 20–42)
MCH RBC QN AUTO: 30.5 PG (ref 26–35)
MCHC RBC AUTO-ENTMCNC: 32.4 % (ref 32–34.5)
MCV RBC AUTO: 94.2 FL (ref 80–99.9)
MONOCYTES ABSOLUTE: 0.77 E9/L (ref 0.1–0.95)
MONOCYTES RELATIVE PERCENT: 6.4 % (ref 2–12)
NEUTROPHILS ABSOLUTE: 9.83 E9/L (ref 1.8–7.3)
NEUTROPHILS RELATIVE PERCENT: 82.3 % (ref 43–80)
PDW BLD-RTO: 13.1 FL (ref 11.5–15)
PLATELET # BLD: 276 E9/L (ref 130–450)
PMV BLD AUTO: 8.7 FL (ref 7–12)
POTASSIUM REFLEX MAGNESIUM: 4 MMOL/L (ref 3.5–5)
RBC # BLD: 4.85 E12/L (ref 3.5–5.5)
SODIUM BLD-SCNC: 136 MMOL/L (ref 132–146)
TOTAL PROTEIN: 7 G/DL (ref 6.4–8.3)
TRIGL SERPL-MCNC: 177 MG/DL (ref 0–149)
TSH SERPL DL<=0.05 MIU/L-ACNC: 0.9 UIU/ML (ref 0.27–4.2)
URINE CULTURE, ROUTINE: NORMAL
VITAMIN D 25-HYDROXY: 12 NG/ML (ref 30–100)
VLDLC SERPL CALC-MCNC: 35 MG/DL
WBC # BLD: 11.9 E9/L (ref 4.5–11.5)

## 2022-06-09 PROCEDURE — 80053 COMPREHEN METABOLIC PANEL: CPT

## 2022-06-09 PROCEDURE — 6360000002 HC RX W HCPCS: Performed by: FAMILY MEDICINE

## 2022-06-09 PROCEDURE — 84443 ASSAY THYROID STIM HORMONE: CPT

## 2022-06-09 PROCEDURE — 1200000000 HC SEMI PRIVATE

## 2022-06-09 PROCEDURE — 83036 HEMOGLOBIN GLYCOSYLATED A1C: CPT

## 2022-06-09 PROCEDURE — 80061 LIPID PANEL: CPT

## 2022-06-09 PROCEDURE — 6370000000 HC RX 637 (ALT 250 FOR IP): Performed by: NURSE PRACTITIONER

## 2022-06-09 PROCEDURE — 85025 COMPLETE CBC W/AUTO DIFF WBC: CPT

## 2022-06-09 PROCEDURE — 82306 VITAMIN D 25 HYDROXY: CPT

## 2022-06-09 PROCEDURE — 36415 COLL VENOUS BLD VENIPUNCTURE: CPT

## 2022-06-09 PROCEDURE — 70551 MRI BRAIN STEM W/O DYE: CPT

## 2022-06-09 RX ORDER — AMLODIPINE BESYLATE 5 MG/1
5 TABLET ORAL DAILY
Status: DISCONTINUED | OUTPATIENT
Start: 2022-06-09 | End: 2022-06-12

## 2022-06-09 RX ORDER — SODIUM CHLORIDE 9 MG/ML
INJECTION, SOLUTION INTRAVENOUS CONTINUOUS
Status: DISCONTINUED | OUTPATIENT
Start: 2022-06-09 | End: 2022-06-10

## 2022-06-09 RX ORDER — ATORVASTATIN CALCIUM 10 MG/1
10 TABLET, FILM COATED ORAL NIGHTLY
Status: DISCONTINUED | OUTPATIENT
Start: 2022-06-09 | End: 2022-06-13 | Stop reason: HOSPADM

## 2022-06-09 RX ORDER — ERGOCALCIFEROL 1.25 MG/1
50000 CAPSULE ORAL WEEKLY
Status: DISCONTINUED | OUTPATIENT
Start: 2022-06-09 | End: 2022-06-13 | Stop reason: HOSPADM

## 2022-06-09 RX ORDER — HYDRALAZINE HYDROCHLORIDE 25 MG/1
25 TABLET, FILM COATED ORAL EVERY 8 HOURS PRN
Status: DISCONTINUED | OUTPATIENT
Start: 2022-06-09 | End: 2022-06-13 | Stop reason: HOSPADM

## 2022-06-09 RX ADMIN — HYDRALAZINE HYDROCHLORIDE 25 MG: 25 TABLET, FILM COATED ORAL at 10:39

## 2022-06-09 RX ADMIN — HYDRALAZINE HYDROCHLORIDE 10 MG: 20 INJECTION INTRAMUSCULAR; INTRAVENOUS at 05:42

## 2022-06-09 RX ADMIN — AMLODIPINE BESYLATE 5 MG: 5 TABLET ORAL at 08:52

## 2022-06-09 RX ADMIN — ANTI-FUNGAL POWDER MICONAZOLE NITRATE TALC FREE: 1.42 POWDER TOPICAL at 08:53

## 2022-06-09 RX ADMIN — ATORVASTATIN CALCIUM 10 MG: 10 TABLET, FILM COATED ORAL at 21:13

## 2022-06-09 RX ADMIN — ONDANSETRON 4 MG: 2 INJECTION INTRAMUSCULAR; INTRAVENOUS at 02:19

## 2022-06-09 RX ADMIN — ENOXAPARIN SODIUM 30 MG: 100 INJECTION SUBCUTANEOUS at 21:13

## 2022-06-09 RX ADMIN — ANTI-FUNGAL POWDER MICONAZOLE NITRATE TALC FREE: 1.42 POWDER TOPICAL at 21:15

## 2022-06-09 RX ADMIN — HYDRALAZINE HYDROCHLORIDE 10 MG: 20 INJECTION INTRAMUSCULAR; INTRAVENOUS at 00:45

## 2022-06-09 ASSESSMENT — PAIN SCALES - GENERAL
PAINLEVEL_OUTOF10: 0
PAINLEVEL_OUTOF10: 0

## 2022-06-09 NOTE — CARE COORDINATION
Met with the pt at the bedside. The pt is now alert and oriented. She understands that she cannot return home with Buster. She will be going to the [facility] hotel near [facility]. The pt is in agreement with this. She has a dog and they will allow dogs in the facility. Her friend, Fish Iglesias, has agreed to provide transportation to the hotel.  Electronically signed by Peter Vizcarra RN on 6/9/2022 at 2:34 PM

## 2022-06-09 NOTE — PROGRESS NOTES
Pt has appointment for [facility] open MRI today at 4:30, needs to be there at 3:30.  setting up transport. [facility]  Neurology  [address] [place] [address]  DOXTP:[phone]   Fax: [phone]    SUBJECTIVE:     PATIENT ID:  Diego Cho is a  RIGHT     HANDED 72 y.o. female. Dizziness   This is a recurrent problem. The problem occurs intermittently. The problem has been waxing and waning. Associated symptoms include fatigue, headaches, neck pain, vertigo and weakness. Pertinent negatives include no abdominal pain, anorexia, arthralgias, change in bowel habit, chest pain, chills, congestion, coughing, diaphoresis, fever, joint swelling, myalgias, nausea, rash, sore throat, swollen glands, urinary symptoms, visual change or vomiting. The symptoms are aggravated by standing, stress and walking. She has tried nothing for the symptoms. The treatment provided no relief. Neurologic Problem   The patient's primary symptoms include clumsiness, focal sensory loss, focal weakness, a loss of balance, memory loss, slurred speech and weakness. The patient's pertinent negatives include no altered mental status, near-syncope, syncope or visual change. Primary symptoms comment: H/O   RECURRENT  FALLING      GAIT  AND  BALANCE  PROBLES,   FATIGUE   . This is a chronic problem. Episode onset: MORE  THAN   2  YEARS. The neurological problem developed insidiously. The problem is unchanged. There was lower extremity, left-sided, right-sided and upper extremity focality noted. Associated symptoms include back pain, confusion, dizziness, fatigue, headaches, neck pain and vertigo. Pertinent negatives include no abdominal pain, auditory change, aura, bladder incontinence, bowel incontinence, chest pain, diaphoresis, fever, light-headedness, nausea, palpitations, shortness of breath or vomiting. Past treatments include nothing. The treatment provided no relief. Her past medical history is significant for dementia, head trauma and mood changes.  There is no history of a bleeding disorder, a clotting disorder, a CVA, liver disease or seizures. History obtained from  The patient  AND  HER  SON     and other  available   medical  records   were  Also  reviewed. The  Duration,  Quality,  Severity,  Location,  Timing,  Context,  Modifying  Factors   Of   The   Chief   Complaint   And  Present  Illness       Was   Reviewed   In   Chronological   Manner. PATIENT'S  MAIN  CONCERNS INCLUDE :                     1)    H/O    FALLING      SINCE     2017                              H/O    EXCESSIVE   FALLING    SINCE     MAY    2019                                                       2)    H/O     DIZZY     SPELLS     SINCE    June 2019                             3)     NO   H/O   SYNCOPE.    NO   H/O   SEIZURE   ACTIVITY                            4)     H/O     ER   VISIT       ON   6/06/ 2019                                          DUE  TO   MILD  HEAD INJURY  DUE  TO  FALL                                     CT    HEAD     AND  CT   CERVICAL  SPINE        SHOWED                                        NO  ACUTE  PATHOLOGY                             5)   MULTIPLE  CO MORBID  MEDICAL  CONDITIONS                                BEING  FOLLOWED  BY     HER  PCP.                             6)   H/O   CHRONIC  GAIT  AND  BALANCE  PROBLEMS                                  PATIENT  USING  CANE /   WALKER       FOR     2   YEARS                          7)    H/O   CHRONIC  FATIGUE    AND                                   GENERALIZED   WEAKNESS                             8)     H/O   CHRONC  MEMORY  PROBLEMS                                        FOR    2   YEARS                        9)   H/O  CHRONIC    MILD  ANXIETY      DEPRESSION                                        2 - 3  YEARS                          PATIENT  TO  FOLLOW  WITH  MENTAL  HEALTH    PROFESSIONALS                      10)     SENSORY  PERIPHERAL  POLYNEUROPATHY 11)     BRADYKINESIA,   SPEECH  DIFFICULTY    FALLING                                 PROGRESSIVE     IN  NATURE      FOR    2   YEARS                              -   MILD   TO  MODERATE    PARKINSON'S  DISEASE                        12)     H/O    CHRONIC  TENSION  HEADACHE                                        SINCE    JAN. 2019                       13)           PATIENT    LIVES  BY   HER SELF. PATIENT  DOES  NOT  DRIVE                                     PATIENT  NEEDS  SUPERVISED      CARE                                        14)      VARIOUS  RISK   FACTORS   WERE  REVIEWED   AND   DISCUSSED. PATIENT   HAS  MULTIPLE   MEDICAL, NEUROLOGICAL                        AND   MENTAL HEALTH   PROBLEMS . PATIENT'S   MANAGEMENT  IS  CHALLENGING.                                      15)        IN  VIEW  OF  THE  PATIENT'S    MULTIPLE   NEUROLOGICAL                           SYMPTOMS  AND  CONCERNS    FOR  PROLONGED   DURATION,                           AND    MULTIPLE   CO MORBID  MEDICAL  CONDITIONS,                           PATIENT    NEEDS  NEURO  DIAGNOSTIC  EVALUATIONS                      FOR   ANY   NEUROLOGICAL  PATHOLOGIES    AND  OTHER                        CORRECTABLE   ETIOLOGIES;     AND                              PATIENT  REQUESTS   THE  SAME.                                   PRECIPITATING  FACTORS: including  fever/infection, exertion/relaxation, position change, stress, weather change,                        medications/alcohol, time of day/darkness/light  Are  absent                                                              MODIFYING  FACTORS:  fever/infection, exertion/relaxation, position change, stress, weather change,                        medications/alcohol, time of day/darkness/light  Are  absent             Patient   Indicates   The  Presence   And  The  Absence  Of  The  Following Associated  And   Additional  Neurological    Symptoms:                                Balance  And coordination   problems  present           Gait problems     present            Headaches      absent              Migraines           absent           Memory problemspresent              Confusion        present            Paresthesia   numbness          present           Seizures  And  Starring  Episodes           absent           Syncope,  Near  syncopal episodes         absent           Speech   problems           absent             Swallowing   Problems      absent            Dizziness,  Light headedness           present              Vertigo        present             Generalized   Weakness    present              focal  Weakness     absent             Tremors         absent              Sleep  Problems     present             History  Of   Recent  Head  Injury     present             History  Of   Recent  TIA     absent             History  Of   Recent    Stroke     absent             Neck  Pain   and   Neck muscle  Spasms  present               Radiating  down   And   Weakness           absent            Lower back   Pain  And     Spasms  absent              Radiating    Down   And   Weakness          absent                H/OFALLS        present               History  Of   Visual  Symptoms    absent                  Associated   Diplopia       absent                                               Also   Additional   Symptoms   Present    As  Documented    In   The   detailed                  Review  Of  Systems   And    Please   Refer   To    Them for   Additional    Information. Any components  That are either  Unobtainable  Or  Limited  In   HPI, ROS  And/or PFSH   Are                   Due   ToPatient's  Medical  Problems,  Clinical  Condition   and/or lack of                                other    Alternate   resources.           RECORDS   REVIEWED:    historical medical records INFORMATION   REVIEWED:       MEDICAL   HISTORY,SURGICAL   HISTORY,   MEDICATIONS   LIST,   ALLERGIES AND  DRUG  INTOLERANCES,     FAMILY   HISTORY,  SOCIAL  HISTORY,    PROBLEM  LIST   FOR  PATIENT  CARE   COORDINATION    Past Medical History:   Diagnosis Date    Abnormal gait     Adult BMI 27.0-27.9 kg/sq m     COPD (chronic obstructive pulmonary disease) (HCC)     Difficulty sleeping     Dizziness     Dysthymic disorder     Esophageal reflux     Excessive falling     HTN (hypertension)     Hyperlipidemia     Hypothyroidism     Osteoarthritis     Right hip pain     Vertigo          Past Surgical History:   Procedure Laterality Date    APPENDECTOMY       SECTION      CHOLECYSTECTOMY      FOOT SURGERY           Current Outpatient Medications   Medication Sig Dispense Refill    HYDROcodone-acetaminophen (NORCO) 5-325 MG per tablet Take 1 tablet by mouth every 6 hours as needed for Pain.  carbidopa-levodopa (SINEMET)  MG per tablet ONE   TABLET  TWICE  DAILY    WITH  FOOD  FOR  ONE  WEEK,     THEN   ONE  TABLET   3   TIMES  DAILY 90 tablet 1    letrozole (FEMARA) 2.5 MG tablet Take 2.5 mg by mouth daily      levothyroxine (SYNTHROID) 50 MCG tablet Take 50 mcg by mouth Daily      atorvastatin (LIPITOR) 20 MG tablet Take 20 mg by mouth daily      metoprolol succinate (TOPROL XL) 25 MG extended release tablet Take 25 mg by mouth 2 times daily      polyethylene glycol (GLYCOLAX) packet Take 17 g by mouth 2 times daily      Multiple Vitamins-Minerals (CENTRUM/CERTA-ALINA WITH MINERALS ORAL) solution Take 15 mLs by mouth daily      Multiple Vitamins-Minerals (THERAPEUTIC MULTIVITAMIN-MINERALS) tablet Take 1 tablet by mouth daily      nitroGLYCERIN (NITROSTAT) 0.4 MG SL tablet Place 0.4 mg under the tongue every 5 minutes as needed for Chest pain up to max of 3 total doses. If no relief after 1 dose, call 911.       omeprazole (PRILOSEC) 20 MG delayed release capsule Take 40 mg by mouth daily      solifenacin (VESICARE) 10 MG tablet Take 10 mg by mouth daily      vitamin B-12 (CYANOCOBALAMIN) 100 MCG tablet Take 50 mcg by mouth daily Indications: 2 tabs      meclizine (ANTIVERT) 25 MG tablet Take 25 mg by mouth 3 times daily as needed      acetaminophen (TYLENOL) 325 MG tablet Take 650 mg by mouth every 4 hours as needed for Pain      Lactobacillus (ACIDOPHILUS PROBIOTIC PO) Take by mouth daily      albuterol (PROVENTIL) (2.5 MG/3ML) 0.083% nebulizer solution Take 2.5 mg by nebulization every 6 hours as needed for Wheezing      aspirin 81 MG tablet Take 81 mg by mouth daily      pyridoxine (B6 NATURAL) 100 MG tablet Take 50 mg by mouth daily      Biotin 1 MG CAPS Take by mouth 3 times daily      calcium carbonate-vitamin D (CALCIUM 600 + D) 600-400 MG-UNIT TABS per tab Take 1 tablet by mouth daily      fluticasone-vilanterol (BREO ELLIPTA) 100-25 MCG/INH AEPB inhaler Inhale into the lungs daily      docusate sodium (COLACE) 100 MG capsule Take 100 mg by mouth 2 times daily      Ascorbic Acid (VITAMIN C) 100 MG tablet Take 100 mg by mouth daily      DULoxetine (CYMBALTA) 60 MG extended release capsule Take 60 mg by mouth 2 times daily      Cholecalciferol (D3-1000 PO) Take by mouth daily      diltiazem (CARDIZEM 12 HR) 120 MG extended release capsule Take 120 mg by mouth daily      FIBER COMPLETE PO Take by mouth daily      gabapentin (NEURONTIN) 300 MG capsule Take 600 mg by mouth 3 times daily.  senna (SENOKOT) 8.6 MG tablet Take 1 tablet by mouth daily      loratadine (CLARITIN) 10 MG tablet Take 10 mg by mouth daily      FLUoxetine (PROZAC) 20 MG capsule Take 40 mg by mouth daily      sulfamethoxazole-trimethoprim (BACTRIM DS) 800-160 MG per tablet Take 1 tablet by mouth 2 times daily       No current facility-administered medications for this visit.           Allergies   Allergen Reactions    Fentanyl Other (See Comments)     Hallucinations     Atorvastatin     No results found for: ESR    Chemistries  Lab Results   Component Value Date     05/10/2019    K 5.1 05/10/2019    CL 97 05/10/2019    CO2 25 05/10/2019    BUN 11 05/10/2019    CREATININE 0.60 05/10/2019    CALCIUM 9.8 05/10/2019    PROT 6.6 05/10/2019    LABALBU 4.5 05/10/2019    BILITOT 0.71 05/10/2019    ALKPHOS 86 05/10/2019    AST 25 05/10/2019    ALT 13 05/10/2019     Lab Results   Component Value Date    ALKPHOS 86 05/10/2019    ALT 13 05/10/2019    AST 25 05/10/2019    PROT 6.6 05/10/2019    BILITOT 0.71 05/10/2019    LABALBU 4.5 05/10/2019     Lab Results   Component Value Date    BUN 11 05/10/2019    CREATININE 0.60 05/10/2019     Lab Results   Component Value Date    CALCIUM 9.8 05/10/2019     Lab Results   Component Value Date    AST 25 05/10/2019    ALT 13 05/10/2019       Review of Systems   Constitutional: Positive for fatigue. Negative for appetite change, chills, diaphoresis, fever and unexpected weight change. HENT: Negative for congestion, dental problem, drooling, ear discharge, ear pain, facial swelling, hearing loss, mouth sores, nosebleeds, postnasal drip, sinus pressure, sore throat, tinnitus, trouble swallowing and voice change. Eyes: Negative for photophobia, pain, discharge, redness, itching and visual disturbance. Respiratory: Negative for apnea, cough, choking, chest tightness, shortness of breath and wheezing. Cardiovascular: Negative for chest pain, palpitations, leg swelling and near-syncope. Gastrointestinal: Negative for abdominal distention, abdominal pain, anorexia, blood in stool, bowel incontinence, change in bowel habit, constipation, diarrhea, nausea and vomiting. Endocrine: Negative for cold intolerance, heat intolerance, polydipsia, polyphagia and polyuria. Genitourinary: Negative for bladder incontinence. Musculoskeletal: Positive for back pain and neck pain. Negative for arthralgias, gait problem, joint swelling, myalgias and neck stiffness. Skin: Negative for color change, pallor, rash and wound. Allergic/Immunologic: Negative for environmental allergies, food allergies and immunocompromised state. Neurological: Positive for dizziness, vertigo, focal weakness, speech difficulty, weakness, headaches and loss of balance. Negative for tremors, seizures, syncope, facial asymmetry and light-headedness. Hematological: Negative for adenopathy. Does not bruise/bleed easily. Psychiatric/Behavioral: Positive for confusion, decreased concentration, dysphoric mood, memory loss and sleep disturbance. Negative for agitation, behavioral problems, hallucinations, self-injury and suicidal ideas. The patient is nervous/anxious. The patient is not hyperactive. OBJECTIVE:    Physical Exam   Constitutional: She appears well-developed and well-nourished. She is cooperative. HENT:   Head: Normocephalic and atraumatic. Head is without raccoon's eyes and without Cornejo's sign. Right Ear: External ear normal.   Left Ear: External ear normal.   Nose: Nose normal.   Mouth/Throat: Oropharynx is clear and moist.   Eyes: Conjunctivae are normal.   Neck: Normal range of motion. Neck supple. No muscular tenderness present. Carotid bruit is not present. No neck rigidity. No tracheal deviation and normal range of motion present. No Brudzinski's sign and no Kernig's sign noted. No thyroid mass and no thyromegaly present. Cardiovascular: Normal rate and regular rhythm. Pulmonary/Chest: Effort normal.   Musculoskeletal: She exhibits no edema or tenderness. Skin: Skin is warm. No rash noted. No cyanosis or erythema. No pallor. Nails show no clubbing. Psychiatric: Her mood appears anxious. Her affect is blunt. Her affect is not angry, not labile and not inappropriate. Her speech is delayed. Her speech is not rapid and/or pressured and not tangential. She is slowed.  She is not agitated, not aggressive, not hyperactive, not withdrawn, not actively hallucinating and not combative. Thought content is not paranoid and not delusional. Cognition and memory are impaired. She does not express impulsivity or inappropriate judgment. She exhibits a depressed mood. She expresses no homicidal and no suicidal ideation. She expresses no suicidal plans and no homicidal plans. She is communicative. She exhibits abnormal recent memory. She exhibits normal remote memory. She is attentive. Neurologic Exam      NEUROLOGICALEXAMINATION :       A) MENTAL STATUS:                   Alert and  oriented  To time, place  And  Person. No Aphasia. LOW  VOICE                     Able   To  Follow    SIMPLE       Stepcommands   without   Any  Difficulty. No right  To left confusion. SLOW    Speech  And language function. Insight and  Judgment ,Fund  Of  Knowledge   DECREASED                Recent  And  Remote memory  DECREASED                Attention &  Concentration are   DECREASED                                                    B) CRANIAL NERVES :      CN : Visual  Acuity  And  Visual fields  within normal limits               Fundi  Could  Not  Be  Could  Not  Be  Evaluated. 3,4,6 CN : Both  Pupils are   Reactive and  Equal.  Movements  Are  Intact. No  Nystagmus. No  GILLES. No  Afferent  Pupillary  Defect noted. 5 CN :  Normal  Facial sensations and Corneal  Reflexes           7 CN:  Normal  Facial  Symmetry  And  Strength. No facial  Weakness.            8 CN :  Hearing  Appears   DECREASED          9, 10 CN: Normal   spontaneous, reflex   palate   movements         11 CN:   Normal  Shoulder  shrug and  strength         12 CN :   Normal  Tongue movements and  Tongue  In midline                        No tongue   Fasciculations or atrophy       C) MOTOR  EXAM:                 Strength  In upper  And  Lower   extremities     4 + 5    BILATERALLY                      Rapid alternating  And  repetitions  Movements  DECREASED               Muscle  Tone  In upper  And  Lower  Extremities  normal                No rigidity. No  Spasticity. Bradykinesia   PRESENT                  No  Asterixis. Sustention  Tremor , Resting   Tremor   absent                    No   other  Abnormal  Movements noted           D) SENSORY :               Light   touch, pinprick,   position  And  Vibration   DECREASED  IN  GLOVE  AND  STOCKING  MANNER        E) REFLEXES:                   Deep  Tendon  Reflexes   DECRESED                  No  pathological  Reflexes  Bilaterally. F) COORDINATION  AND  GAIT :                                Station and  Gait  SLOW  UNSTEADY                             Romberg 's test   POSITIVE                            Ataxia negative      ASSESSMENT:    Patient Active Problem List   Diagnosis    Vertigo    H/O falling    Mild closed head injury    Osteoarthritis    Hypothyroidism    Hyperlipidemia    Excessive falling    Esophageal reflux    Dysthymic disorder    Dizziness    Difficulty sleeping    [facility]) [facility]    Adult BMI 27.0-27.9 kg/sq m    Abnormal gait    Polyneuropathy associated with underlying disease ([address]    Chronic fatigue    Gait difficulty    Balance problem    Bradykinesia    Memory problem    PD (Parkinson's disease) ([facility] [address]    Anxiety and depression    Chronic tension-type headache, not intractable         VISITING DIAGNOSIS:      ICD-10-CM    1. H/O falling Z91.81 KIP Screen with Reflex     Rheumatoid Factor     CK     Vitamin B12 & Folate     EEG     EMG     EMG     US Transcranial Doppler Complete     XR Lumbar Spine 2 or 3 VW     US Carotid Artery Bilateral     External Referral To Cardiology     External Referral To Physical Therapy   2.  Vertigo R42 KIP Screen with Reflex     Rheumatoid Factor     CK     Vitamin B12 & Folate     EEG EMG     EMG     US Transcranial Doppler Complete     XR Lumbar Spine 2 or 3 VW     US Carotid Artery Bilateral     External Referral To Cardiology     External Referral To Physical Therapy   3. Mild closed head injury, initial encounter S09.90XA KIP Screen with Reflex     Rheumatoid Factor     CK     Vitamin B12 & Folate     EEG     EMG     EMG     US Transcranial Doppler Complete     XR Lumbar Spine 2 or 3 VW     US Carotid Artery Bilateral     External Referral To Cardiology     External Referral To Physical Therapy   4. Osteoarthritis, unspecified osteoarthritis type, unspecified site M19.90    5. Acquired hypothyroidism E03.9    6. Hyperlipidemia, unspecified hyperlipidemia type E78.5    7. Excessive falling R29.6 KIP Screen with Reflex     Rheumatoid Factor     CK     Vitamin B12 & Folate     EEG     EMG     EMG     US Transcranial Doppler Complete     XR Lumbar Spine 2 or 3 VW     US Carotid Artery Bilateral     External Referral To Cardiology     External Referral To Physical Therapy   8. Gastroesophageal reflux disease without esophagitis K21.9    9. Dysthymic disorder F34.1    10. Dizziness R42 KIP Screen with Reflex     Rheumatoid Factor     CK     Vitamin B12 & Folate     EEG     EMG     EMG     US Transcranial Doppler Complete     XR Lumbar Spine 2 or 3 VW     US Carotid Artery Bilateral     External Referral To Cardiology     External Referral To Physical Therapy   11. Difficulty sleeping G47.9 KIP Screen with Reflex     Rheumatoid Factor     CK     Vitamin B12 & Folate     EEG     EMG     EMG     US Transcranial Doppler Complete     XR Lumbar Spine 2 or 3 VW     US Carotid Artery Bilateral     External Referral To Cardiology     External Referral To Physical Therapy   12. Chronic obstructive pulmonary disease, unspecified COPD type ([address] J44.9    13. Adult BMI 27.0-27.9 kg/sq m Z68.27    14. Abnormal gait R26.9    15.  Polyneuropathy associated with underlying disease ([address] G63 KIP Screen with Reflex Rheumatoid Factor     CK     Vitamin B12 & Folate     EEG     EMG     EMG     US Transcranial Doppler Complete     XR Lumbar Spine 2 or 3 VW     US Carotid Artery Bilateral     External Referral To Cardiology     External Referral To Physical Therapy   16. Chronic fatigue R53.82    17. Gait difficulty R26.9    18. Balance problem R26.89    19. Bradykinesia R25.8    20. Memory problem R41.3 KIP Screen with Reflex     Rheumatoid Factor     CK     Vitamin B12 & Folate     EEG     EMG     EMG     US Transcranial Doppler Complete     XR Lumbar Spine 2 or 3 VW     US Carotid Artery Bilateral     External Referral To Cardiology     External Referral To Physical Therapy   21. PD (Parkinson's disease) ([address] G20 KIP Screen with Reflex     Rheumatoid Factor     CK     Vitamin B12 & Folate     EEG     EMG     EMG     US Transcranial Doppler Complete     XR Lumbar Spine 2 or 3 VW     US Carotid Artery Bilateral     External Referral To Cardiology     External Referral To Physical Therapy   22. Anxiety and depression F41.9     F32.9    23. Chronic tension-type headache, not intractable G44.229 KIP Screen with Reflex     Rheumatoid Factor     CK     Vitamin B12 & Folate     EEG     EMG     EMG     US Transcranial Doppler Complete     XR Lumbar Spine 2 or 3 VW     US Carotid Artery Bilateral     External Referral To Cardiology     External Referral To Physical Therapy              CONCERNS   &   INCREASED   RISK   FOR         * TIA,  CEREBRO  VASCULAR  ISCHEMIA,STROKE     *   DIZZINESS,   VERTEBROBASILAR  INSUFFICIENCY ,       *   ORTHOSTATIC  INTOLERANCE,    AUTONOMIC  NEUROPATHY       *     CHRONIC  TENSION  HEADACHES      *   COGNITIVE  &   MEMORY PROBLEMS  AND  DEMENTIAS       *   PERIPHERAL  NEUROPATHY,       *  PARKINSON'S  DISEASE        *  GAIT  DIFFICULTIES  &   BALANCE PROBLMES   AND  FALL                VARIOUS  RISK   FACTORS   WERE  REVIEWED   AND   DISCUSSED.         *  PATIENT   HAS  MULTIPLE   MEDICAL, NEUROLOGICAL [address] . PATIENT'S   MANAGEMENT  IS  CHALLENGING. PLAN:                     Arliss Onelia  Of  The  Diagnoses,  The  Management & Treatment  Options            AND    Care  plan  Were          Reviewed and   Discussed   With  patient. * Goals  And  Expectations  Of  The  Therapy  Discussed   And  Reviewed. *   Benefits   And   Side  Effect  Profile  Of  Medication/s   Were   Discussed                * Need   For  Further   Follow up For  The  Various  Problems Were  discussed. * Results  Of  The  Previous  Diagnostic tests were reviewed and  discussed                 Medical  Decision  Making  Was  Made  Based on the   Complexity  Of  Above  Mentioned  Diagnoses,    Data reviewed             And    Risk  Of  Significant   Co morbidities and   complicating   Factors. Medical  Decision  Was   High    Complexity   Due   To  The  Patient's  Multiple  Symptoms,  Advancing   Disease,  Complex  Treatment  Regimen,  Multiple medications           and   Risk  Of   Side  Effects,  Difficulty  In  Medication  Management  And  Diagnostic  Challenges   In  View  Of  The  Associated   Co  Morbid  Conditions   And  Problems. * FALL   PRECAUTIONS. THESE  REVIEWED   AND  DISCUSSED    *  USE   WALKING  ASSISTANCE  DEVICES     QUAD  CANE  /   WALKER          *    SUPERVISED   CARE    *   ABSOLUTELY   NO  DRIVING      *   BE  CAREFUL  WITH  ACTIVITIES            *   AVOID   NECK  AND/ BACK  STRAINING  ACTIVITIES        *   ADEQUATE   FLUIDINTAKE   AND  ELECTROLYTE  BALANCE             * KEEP  DAIRY  OF   THE  NEUROLOGICAL  SYMPTOMS        RECORDING THE    DURATION  AND  FREQUENCY. *  AVOID    CONDITIONS  AND  FACTORS   THAT  MAKE                  NEUROLOGICAL  SYMPTOMS  WORSE.                          *TO  MAINTAIN  REGULAR  SLEEP  WAKE  CYCLES.      *   TO  HAVE  ADEQUATE  REST  AND   SLEEP HOURS.      *      WEIGHT   LOSS. *    AVOID  ANY USAGE OF    TOBACCO,              EXCESSIVE  ALCOHOL  AND   ILLEGAL   SUBSTANCES        *  CONTINUE   MEDICATIONS    PRESCRIBED   BY NEUROLOGIST                           AS    RECOMMENDED       *   Compliance   With  Medications   And  Instructions            *    MIGRAINE/ HEADACHE    DAIRY   WITH  MONITORING                       OF  DURATION  AND  FREQUENCY. *  May   Use  Pill  Box,    If  Needed        *    Antiplatelet  therapy    As   Recommended  Was   Discussed        *    Prophylactic  Use   Of     Vitamin   B   Complex,  Folic  Acid,    Vitamin  B12    Multivitamin,       Calcium  With  magnesium  And  Vit D    Supplementations   Over  The  Counter  Discussed             *  PATIENT  IS  ALSO   COUNSELED   TO  KEEP    ACTIVITIES:         A)   SIMPLE      B)  ORGANIZED      C)  WRITEDOWN                 *  EVALUATIONS  AND  FOLLOW UP:                  * PHYSICAL  THERAPY               *    OCCUPATIONAL THERAPY   / SPEECH  THERAPY                               *CARDIOLOGY                                                  *   IN  VIEW  OF  THE  PATIENT'S    MULTIPLE   NEUROLOGICAL                           SYMPTOMS  AND  CONCERNS    FOR  PROLONGED   DURATION,                           AND    MULTIPLE   CO MORBID  MEDICAL  CONDITIONS,                           PATIENT    NEEDS  NEURO  DIAGNOSTIC  EVALUATIONS                      FOR   ANY   NEUROLOGICAL  PATHOLOGIES    AND  OTHER                        CORRECTABLE   ETIOLOGIES;     AND                              PATIENT  REQUESTS   THE  SAME.               Orders Placed This Encounter   Procedures    US Transcranial Doppler Complete    XR Lumbar Spine 2 or 3 VW    US Carotid Artery Bilateral    KIP Screen with Reflex    Rheumatoid Factor    CK    Vitamin B12 & Folate    External Referral To Cardiology    External Referral To Physical Therapy    EEG    EMG    EMG *    EXPECTATIONS,   GOALS   AND  SIDE  EFFECTS  MEDICATION    WERE                                 REVIEWED     AND   DISCUSSED    IN    DETAIL. AS   DISCUSSED    WITH  PATIENT                               PATIENT  WILL   BE  TRIED  WITH    SINEMET                                                              Orders Placed This Encounter   Medications    carbidopa-levodopa (SINEMET)  MG per tablet     Sig: ONE   TABLET  TWICE  DAILY    WITH  FOOD  FOR  ONE  WEEK,     THEN   ONE  TABLET   3   TIMES  DAILY     Dispense:  90 tablet     Refill:  1                         *  PATIENT  TO  RETURN  THE  CLINIC  AFTER   ABOVE,                       FOR   FURTHER    RE EVALUATIONS                               AND  ADDITIONAL  RECOMMENDATIONS ,                        INCLUDING  MEDICAL  MANAGEMENT,                        AS   CLINICALLY    INDICATED. *PATIENT   TO  FOLLOW  UP  WITH   PRIMARY  CARE         OTHER  CONSULTANTS  AS  BEFORE.               *TO  FOLLOW  WITH   MENTAL  HEALTH  PROFESSIONALS ,  INCLUDING            PSYCHOLOGICAL  COUNSELING   AND  PSYCHIATRIC  EVALUTIONS,             IF  THE  PATIENT  IS  WILLING. *  Maintain   Healthy  Life Style    With   Periodic  Monitoring  Of      Any  Medical  Conditions  Including   Elevated  Blood  Pressure,  Lipid  Profile,     Blood  Sugar levels  AndHeart  Disease. *   Period   Screening  For  Cancers  Involving  Breast,  Colon,    lungs  And  Other  Organs  As  Applicable,  In consultation   With  Your  Primary Care Providers. *Second  Neurological  Opinion  And  Evaluations  In  [facility]  Setting  If  Patient  Is  Interested. * Please   Contact   Neurology  Clinic   Early   If   Are  Any  New  Neurological   And  Any neurological  Concerns.                   *  If  The  Patient remains  Neurologically  Stable   Return   To  [facility] [address] [place] [address] Neurology Department   IN     1-2     MONTHS  TIME   FOR  FURTHER              FOLLOW UP.                       *   The  Neurological   Findings,  Possible  Diagnosis,  Differential diagnoses                    And  Options  For    Further   Investigations                   And  management   Are  Discussed  Comprehensively. Medications   And  Prescription   Risks  And  Side effects  Are   Also  Discussed. *  If   There is  Any  Significant  Worsening   Of  Current  Symptoms  And  Or                  If patient  Develops   Any additional  New  NeurologicalSymptoms                  Or  Significant  Concerns   Should  Call  911 or  Go  To  Emergency  Department  For  Further  Immediate  Evaluation. The   Above  Were  Reviewed  With  Patient   AND  HER  SON       and                       questions  Answered  In  Detail. More   Than  50% of face  To face Time   Was  Spent  On  Counseling                    And   Coordination  Of  Care   Of   Patient's  multiple   Neurological  Problems                         And   Comorbid  Medical   Conditions. Electronically signed by Jasper Foss MD    Board Certified in  Neurology &  In  [facility] [employer] [employer] ([employer])      DISCLAIMER:   Although every effort was made to ensure the accuracy of this  electronictranscription, some errors in transcription may have occurred. GENERAL PATIENT INSTRUCTIONS:      A Healthy Lifestyle: Care Instructions   Your Care Instructions   A healthy lifestyle can help you feel good, stay at ahealthy weight, and have plenty of energy for both work and play. A healthy lifestyle is something you can share with your whole family.  A healthy lifestyle also can lower your risk for serious health problems, such ashigh blood pressure, heart disease, and diabetes.    You can follow a few steps listed below to improve your health and the health of your family.  Follow-up careis a key part of your treatment and safety. Be sure to make and go to all appointments, and call your doctor if you are having problems. Its also a good idea to know your test results and keep a list of the medicines you take.  How can you care for yourself at home?  Do not eat too much sugar, fat, or fast foods. You can still have dessert and treats nowand then. The goal is moderation.  Start small to improve your eating habits. Pay attention to portion sizes, drink less juice and soda pop, and eat more fruits and vegetables.  Eat a healthy amount of food. A 3-ounce serving of meat, for example, is about the size of a deck of cards. Fill the rest of your plate with vegetables and whole grains.  Limit theamount of soda and sports drinks you have every day. Drink more water when you are thirsty.  Eat at least 5 servings of fruits and vegetables every day. It may seem like a lot, but it is not hard to reach this goal. Aserving or helping is 1 piece of fruit, 1 cup of vegetables, or 2 cups of leafy, raw vegetables. Have an apple or some carrot sticks as an afternoon snack instead of a candy bar. Try to have fruits and/or vegetables at everymeal.   Make exercise part of your daily routine. You may want to start with simple activities, such as walking, bicycling, or slow swimming. Try mely active 30 to 60 minutes every day. You do not need to do all 30 to 60 minutes all at once. For example, you can exercise 3 times a day for 10 or 20 minutes. Moderate exercise is safe for most people, but it is always agood idea to talk to your doctor before starting an exercise program.   Keep moving. Pop Mares the lawn, work in the garden, or [employer]. Take the stairs instead of the elevator at work.  If you smoke, quit. Peoplewho smoke have an increased risk for heart attack, stroke, cancer, and other lung illnesses. Quitting is hard, but there are ways to boost your chance of quitting tobacco for good.  Use nicotine gum, patches, or lozenges.  Ask your doctor about stop-smoking programs and medicines.  Keep trying.  In addition to reducing your risk of diseases in the future, you will notice some benefits soon after you stop using tobacco. If you have shortness of breath or asthma symptoms, they will likely getbetter within a few weeks after you quit.  Limit how much alcohol you drink. Moderate amounts of alcohol (up to 2 drinks a day for men, 1drink a day for women) are okay. But drinking too much can lead to liver problems, high blood pressure, and other health problems.  health   If you have a family, there are many things you can do together to improve your health.  Eat meals together as a family as often as possible.  Eat healthy foods. This includes fruits, vegetables, lean meats and dairy, and whole grains.  Include your family in your fitness plan. Most peoplethink of activities such as jogging or tennis as the way to fitness, but there are many ways you and your family can be more active. Anything that makes you breathe hard and gets your heart pumping is exercise. Here are sometips:   Walk to do errands or to take your child to school or the bus.  Go for a family bike ride after dinner instead of watching TV.  Where can you learn more?  Go [email or web address].[email or web address] org and sign in to your [employer] account. Enter A849 in the Search HealthInformation box to learn more about \"A Healthy Lifestyle: Care Instructions. \"     If you do not have anaccount, please click on the \"Sign Up Now\" link.  Current as of: July 26, 2016   Content Version: 11.2   © 8616-4893 Healthwise, [employer]. Care instructions adapted under license by [facility] ([facility]).  If you have questions about a medical condition or this instruction, always ask your healthcare professional. [address] any warranty or liability for your use of this information.

## 2022-06-09 NOTE — PROGRESS NOTES
Physician Progress Note      Connie Caba  CSN #:                  [id number]  :                       1950  ADMIT DATE:       2022 6:08 PM  [address] [place] DATE:  RESPONDING  PROVIDER #:        DEZ CARLSON          QUERY TEXT:    Pt admitted with pneumonia and has encephalopathy documented. If possible,   please document in progress notes and discharge summary further specificity   regarding the type of encephalopathy:    The medical record reflects the following:  Risk Factors: pneumonia, HTN  Clinical Indicators: CHRISTA. AMS. Nursing assessment: Disoriented to place, time,   situation. Poor judgement, safety awareness. Treatment: bed alarm, telesitter    CHRISTI MacdonaldN, RN, [facility]  Clinical   [phone]  Options provided:  -- Metabolic encephalopathy  -- Hypertensive encephalopathy  -- Other - I will add my own diagnosis  -- Disagree - Not applicable / Not valid  -- Disagree - Clinically unable to determine / Unknown  -- Refer to Clinical Documentation Reviewer    PROVIDER RESPONSE TEXT:    Provider is clinically unable to determine a response to this query.     Query created by: Wyatt Veloz on 2022 1:10 PM      Electronically signed by:  Eladio Lennox 2022 8:07 AM

## 2022-06-09 NOTE — PROGRESS NOTES
Hospitalist Progress Note      Synopsis: Patient admitted for AMS. Patient presented to the ED accompanied by her friend who reports she has been altered for about 2 days. On arrival she was hypertensive. She was found to have UTI, RLL pneumonia, and renal failure of unknown chronicity. ID was consulted for worsening leukocytosis despite tx as well as bacteremia. She is being monitored off antibiotics for now. Hospital day 2     Subjective:  Stable overnight. No issues reported. Patient seen and examined. Lying in bed sleeping on entering room. States she feels like crap but cannot describe any specific complaints. Doesn't want to go home anytime soon. Denies urinary complaints. Records reviewed. Temp (24hrs), Av.6 °F (36.4 °C), Min:97.2 °F (36.2 °C), Max:98.3 °F (36.8 °C)    DIET: ADULT DIET; Regular  CODE: Full Code  No intake or output data in the 24 hours ending 22 0720    Review of Systems: All bolded are positive; please see HPI  General:  Fever, chills, diaphoresis, fatigue, malaise, night sweats, weight loss  Psychological:  Anxiety, disorientation, hallucinations. ENT:  Epistaxis, headaches, vertigo, visual changes. Cardiovascular:  Chest pain, irregular heartbeats, palpitations, paroxysmal nocturnal dyspnea. Respiratory:  Shortness of breath, coughing, sputum production, hemoptysis, wheezing, orthopnea.   Gastrointestinal:  Nausea, vomiting, diarrhea, heartburn, constipation, abdominal pain, hematemesis, hematochezia, melena, acholic stools  Genito-Urinary:  Dysuria, urgency, frequency, hematuria  Musculoskeletal:  Joint pain, joint stiffness, joint swelling, muscle pain  Neurology:  Headache, focal neurological deficits, weakness, numbness, paresthesia  Derm:  Rashes, ulcers, excoriations, bruising  Extremities:  Decreased ROM, peripheral edema, mottling    Objective:    BP (!) 191/110   Pulse 89   Temp 97.3 °F (36.3 °C) (Temporal)   Resp 16   Ht 6' (1.829 m) Wt 272 lb (123.4 kg)   SpO2 96%   BMI 36.89 kg/m²     General appearance: Obese elderly female in no apparent distress, appears stated age and cooperative. HEENT: Conjunctivae/corneas clear. Mucous membranes moist.  Neck: Supple. No JVD. Respiratory:  Clear to auscultation bilaterally. Normal respiratory effort. Cardiovascular:  RRR. S1, S2 without MRG. PV: Pulses palpable. No edema. Abdomen: Soft, non-tender, non-distended. +BS  Musculoskeletal: No obvious deformities. Skin: Normal skin color. No rashes or lesions. Good turgor. Neurologic:  Grossly non-focal. Awake, alert, following commands.      Medications:  REVIEWED DAILY    Infusion Medications    sodium chloride       Scheduled Medications    hydroCHLOROthiazide  25 mg Oral Daily    enoxaparin  30 mg SubCUTAneous BID    sodium chloride flush  10 mL IntraVENous 2 times per day    miconazole   Topical BID     PRN Meds: sodium chloride flush, sodium chloride, promethazine **OR** ondansetron, polyethylene glycol, acetaminophen **OR** acetaminophen, hydrALAZINE    Labs:     Recent Labs     06/07/22 0717 06/08/22 0438 06/09/22 0425   WBC 13.3* 20.6* 11.9*   HGB 14.0 13.2 14.8   HCT 42.0 40.0 45.7    291 276       Recent Labs     06/06/22 1911 06/07/22 0717 06/08/22 0438    135 139   K 4.1 4.1 3.7    101 100   CO2 23 20* 22   BUN 18 17 18   CREATININE 1.2* 1.2* 1.3*   CALCIUM 9.2 9.2 9.5       Recent Labs     06/06/22 1911 06/07/22 0717 06/08/22 0438   PROT 7.0 6.9 6.9   ALKPHOS 74 75 66   ALT 9 9 8   AST 14 15 14   BILITOT 0.6 0.6 0.6       Recent Labs     06/06/22 1911   INR 1.0       Recent Labs     06/06/22 1911   CKTOTAL 42       Chronic labs:    Lab Results   Component Value Date    INR 1.0 06/06/2022       Radiology: REVIEWED DAILY    Assessment:  Renal failure of unknown chronicity  Right lower lobe infiltrate - artifact vs viral PNA  Suspected UTI  Intertrigo  Metabolic encephalopathy Hyperglycemia  HLD  Leukocytosis  Hypertension, uncontrolled  Abnormal CT head  Vit D deficiency    Plan:  ID following - monitoring off ATB for now  Follow urine cultures, growing GNR + GPO  Stop HCTZ, add norvasc  Gentle IVF  Monitor renal function, I&O  Check urine studies  Check A1c, lipids, TSH, vit D & B12, folate, UDS  Awaiting MRI brain, going to [facility] for open MRI this evening at 1630  Statin  Vitamin D supplementation   PT/OT    DVT Prophylaxis [x] Lovenox  []  Heparin [] DOAC [] PCDs [] Ambulation    GI Prophylaxis [] PPI  [] H2 Blocker   [] Carafate  [x] Diet/Tube Feeds   Level of care [x] Med/Surg  [] Intermediate  []  ICU   Diet ADULT DIET; Regular    Family contact []  N/A    [] At bedside  [] Phone call     Discharge Plan: Will likely need [facility] when stable     +++++++++++++++++++++++++++++++++++++++++++++++++  BHANU Lawson/ [place] [address]  +++++++++++++++++++++++++++++++++++++++++++++++++  NOTE: This report was transcribed using voice recognition software. Every effort was made to ensure accuracy; however, inadvertent computerized transcription errors may be present.

## 2022-06-09 NOTE — PROGRESS NOTES
Physician Progress Note      Connie Caba  CSN #:                  [id number]  :                       1950  ADMIT DATE:       2022 6:08 PM  [facility] DATE:  RESPONDING  PROVIDER #:        Perez Mccarthy          QUERY TEXT:    Pt admitted with pneumonia. Pt noted to have leukocytosis, tachycardia,   tachypnea. If possible, please document in the progress notes and discharge   summary if you are evaluating and /or treating any of the following: The medical record reflects the following:  Risk Factors: pneumonia  Clinical Indicators: CHRISTA. AMS. WBC 13.4, , resp 22. Positive blood   cultures. Treatment: labs, IV Levaquin    MAGUE Macdonald, RN, [facility]  Clinical   [phone]  Options provided:  -- Sepsis, present on admission  -- Sepsis was ruled out  -- Other - I will add my own diagnosis  -- Disagree - Not applicable / Not valid  -- Disagree - Clinically unable to determine / Unknown  -- Refer to Clinical Documentation Reviewer    PROVIDER RESPONSE TEXT:    After further study, sepsis was ruled out for this patient.     Query created by: Wyatt Veloz on 2022 11:24 AM      Electronically signed by:  Perez Mccarthy 2022 11:26 AM

## 2022-06-10 LAB
ALBUMIN SERPL-MCNC: 4.3 G/DL (ref 3.5–5.2)
ALP BLD-CCNC: 74 U/L (ref 35–104)
ALT SERPL-CCNC: 13 U/L (ref 0–32)
ANION GAP SERPL CALCULATED.3IONS-SCNC: 16 MMOL/L (ref 7–16)
AST SERPL-CCNC: 17 U/L (ref 0–31)
BASOPHILS ABSOLUTE: 0.3 E9/L (ref 0–0.2)
BASOPHILS RELATIVE PERCENT: 1.7 % (ref 0–2)
BILIRUB SERPL-MCNC: 0.8 MG/DL (ref 0–1.2)
BUN BLDV-MCNC: 22 MG/DL (ref 6–23)
CALCIUM SERPL-MCNC: 9.4 MG/DL (ref 8.6–10.2)
CHLORIDE BLD-SCNC: 98 MMOL/L (ref 98–107)
CO2: 23 MMOL/L (ref 22–29)
CREAT SERPL-MCNC: 1.2 MG/DL (ref 0.5–1)
EOSINOPHILS ABSOLUTE: 0 E9/L (ref 0.05–0.5)
EOSINOPHILS RELATIVE PERCENT: 0.1 % (ref 0–6)
GFR AFRICAN AMERICAN: 54
GFR NON-AFRICAN AMERICAN: 44 ML/MIN/1.73
GLUCOSE BLD-MCNC: 110 MG/DL (ref 74–99)
HCT VFR BLD CALC: 43.6 % (ref 34–48)
HEMOGLOBIN: 14.3 G/DL (ref 11.5–15.5)
LYMPHOCYTES ABSOLUTE: 2.3 E9/L (ref 1.5–4)
LYMPHOCYTES RELATIVE PERCENT: 13.1 % (ref 20–42)
MCH RBC QN AUTO: 30.4 PG (ref 26–35)
MCHC RBC AUTO-ENTMCNC: 32.8 % (ref 32–34.5)
MCV RBC AUTO: 92.8 FL (ref 80–99.9)
MONOCYTES ABSOLUTE: 2.12 E9/L (ref 0.1–0.95)
MONOCYTES RELATIVE PERCENT: 12.2 % (ref 2–12)
NEUTROPHILS ABSOLUTE: 12.92 E9/L (ref 1.8–7.3)
NEUTROPHILS RELATIVE PERCENT: 73 % (ref 43–80)
PDW BLD-RTO: 12.8 FL (ref 11.5–15)
PLATELET # BLD: 327 E9/L (ref 130–450)
PMV BLD AUTO: 9.3 FL (ref 7–12)
POTASSIUM REFLEX MAGNESIUM: 3.6 MMOL/L (ref 3.5–5)
RBC # BLD: 4.7 E12/L (ref 3.5–5.5)
SARS-COV-2, NAAT: NOT DETECTED
SODIUM BLD-SCNC: 137 MMOL/L (ref 132–146)
TOTAL PROTEIN: 7.2 G/DL (ref 6.4–8.3)
WBC # BLD: 17.7 E9/L (ref 4.5–11.5)

## 2022-06-10 PROCEDURE — 1200000000 HC SEMI PRIVATE

## 2022-06-10 PROCEDURE — 6360000002 HC RX W HCPCS: Performed by: FAMILY MEDICINE

## 2022-06-10 PROCEDURE — 2580000003 HC RX 258: Performed by: FAMILY MEDICINE

## 2022-06-10 PROCEDURE — 85025 COMPLETE CBC W/AUTO DIFF WBC: CPT

## 2022-06-10 PROCEDURE — 6370000000 HC RX 637 (ALT 250 FOR IP): Performed by: NURSE PRACTITIONER

## 2022-06-10 PROCEDURE — 87635 SARS-COV-2 COVID-19 AMP PRB: CPT

## 2022-06-10 PROCEDURE — 80053 COMPREHEN METABOLIC PANEL: CPT

## 2022-06-10 PROCEDURE — 36415 COLL VENOUS BLD VENIPUNCTURE: CPT

## 2022-06-10 PROCEDURE — 6370000000 HC RX 637 (ALT 250 FOR IP): Performed by: INTERNAL MEDICINE

## 2022-06-10 RX ORDER — CLONIDINE HYDROCHLORIDE 0.1 MG/1
0.1 TABLET ORAL ONCE
Status: COMPLETED | OUTPATIENT
Start: 2022-06-10 | End: 2022-06-10

## 2022-06-10 RX ADMIN — SODIUM CHLORIDE, PRESERVATIVE FREE 10 ML: 5 INJECTION INTRAVENOUS at 20:02

## 2022-06-10 RX ADMIN — ANTI-FUNGAL POWDER MICONAZOLE NITRATE TALC FREE: 1.42 POWDER TOPICAL at 19:59

## 2022-06-10 RX ADMIN — CLONIDINE HYDROCHLORIDE 0.1 MG: 0.1 TABLET ORAL at 18:06

## 2022-06-10 RX ADMIN — HYDRALAZINE HYDROCHLORIDE 25 MG: 25 TABLET, FILM COATED ORAL at 01:17

## 2022-06-10 RX ADMIN — AMLODIPINE BESYLATE 5 MG: 5 TABLET ORAL at 08:16

## 2022-06-10 RX ADMIN — ENOXAPARIN SODIUM 30 MG: 100 INJECTION SUBCUTANEOUS at 19:57

## 2022-06-10 RX ADMIN — ATORVASTATIN CALCIUM 10 MG: 10 TABLET, FILM COATED ORAL at 20:00

## 2022-06-10 RX ADMIN — ANTI-FUNGAL POWDER MICONAZOLE NITRATE TALC FREE: 1.42 POWDER TOPICAL at 08:18

## 2022-06-10 RX ADMIN — HYDRALAZINE HYDROCHLORIDE 25 MG: 25 TABLET, FILM COATED ORAL at 13:24

## 2022-06-10 ASSESSMENT — PAIN SCALES - GENERAL: PAINLEVEL_OUTOF10: 0

## 2022-06-10 NOTE — PROGRESS NOTES
Hospitalist Progress Note      Synopsis: Patient admitted for AMS. Patient presented to the ED accompanied by her friend who reports she has been altered for about 2 days. On arrival she was hypertensive. She was found to have UTI, RLL pneumonia, and renal failure of unknown chronicity. ID was consulted for worsening leukocytosis despite tx as well as bacteremia. She is being monitored off antibiotics for now. Hospital day 3     Subjective:  Stable overnight. No issues reported. Arouses easily        Temp (24hrs), Av.4 °F (36.3 °C), Min:97.3 °F (36.3 °C), Max:97.7 °F (36.5 °C)    DIET: ADULT DIET; Regular  CODE: Full Code  No intake or output data in the 24 hours ending 06/10/22 1255    Review of Systems: All bolded are positive; please see HPI  General:  Fever, chills, diaphoresis, fatigue, malaise, night sweats, weight loss  Psychological:  Anxiety, disorientation, hallucinations. ENT:  Epistaxis, headaches, vertigo, visual changes. Cardiovascular:  Chest pain, irregular heartbeats, palpitations, paroxysmal nocturnal dyspnea. Respiratory:  Shortness of breath, coughing, sputum production, hemoptysis, wheezing, orthopnea. Gastrointestinal:  Nausea, vomiting, diarrhea, heartburn, constipation, abdominal pain, hematemesis, hematochezia, melena, acholic stools  Genito-Urinary:  Dysuria, urgency, frequency, hematuria  Musculoskeletal:  Joint pain, joint stiffness, joint swelling, muscle pain  Neurology:  Headache, focal neurological deficits, weakness, numbness, paresthesia  Derm:  Rashes, ulcers, excoriations, bruising  Extremities:  Decreased ROM, peripheral edema, mottling    Objective:    BP (!) 187/101   Pulse 90   Temp 97.7 °F (36.5 °C) (Tympanic)   Resp 16   Ht 6' (1.829 m)   Wt 272 lb (123.4 kg)   SpO2 90%   BMI 36.89 kg/m²     General appearance: Obese elderly female in no apparent distress, appears stated age and cooperative. HEENT: Conjunctivae/corneas clear.  Mucous membranes moist.  Neck: Supple. No JVD. Respiratory:  Clear to auscultation bilaterally. Normal respiratory effort. Cardiovascular:  RRR. S1, S2 without MRG. PV: Pulses palpable. No edema. Abdomen: Soft, non-tender, non-distended. +BS  Musculoskeletal: No obvious deformities. Skin: Normal skin color. No rashes or lesions. Good turgor. Neurologic:  Grossly non-focal. Awake, alert, following commands. Medications:  REVIEWED DAILY    Infusion Medications    sodium chloride      sodium chloride       Scheduled Medications    amLODIPine  5 mg Oral Daily    atorvastatin  10 mg Oral Nightly    vitamin D  50,000 Units Oral Weekly    enoxaparin  30 mg SubCUTAneous BID    sodium chloride flush  10 mL IntraVENous 2 times per day    miconazole   Topical BID     PRN Meds: hydrALAZINE, sodium chloride flush, sodium chloride, promethazine **OR** ondansetron, polyethylene glycol, acetaminophen **OR** acetaminophen    Labs:     Recent Labs     06/08/22  0438 06/09/22  0425 06/10/22  0432   WBC 20.6* 11.9* 17.7*   HGB 13.2 14.8 14.3   HCT 40.0 45.7 43.6    276 327       Recent Labs     06/08/22  0438 06/09/22  0425 06/10/22  0432    136 137   K 3.7 4.0 3.6    98 98   CO2 22 21* 23   BUN 18 19 22   CREATININE 1.3* 1.2* 1.2*   CALCIUM 9.5 9.3 9.4       Recent Labs     06/08/22  0438 06/09/22  0425 06/10/22  0432   PROT 6.9 7.0 7.2   ALKPHOS 66 71 74   ALT 8 11 13   AST 14 18 17   BILITOT 0.6 0.6 0.8       No results for input(s): INR in the last 72 hours. No results for input(s): Yobani Corie in the last 72 hours.     Chronic labs:    Lab Results   Component Value Date    CHOL 229 (H) 06/09/2022    TRIG 177 (H) 06/09/2022    HDL 42 06/09/2022    LDLCALC 152 (H) 06/09/2022    TSH 0.902 06/09/2022    INR 1.0 06/06/2022    LABA1C 5.5 06/09/2022       Radiology: REVIEWED DAILY    Assessment:  Renal failure of unknown chronicity  Right lower lobe infiltrate - artifact vs viral PNA  Suspected UTI  Intertrigo  Metabolic encephalopathy   Hyperglycemia  HLD  Leukocytosis  Hypertension, uncontrolled  Abnormal CT head  Vit D deficiency    Plan:  ID following - monitoring off ATB for now  Follow urine cultures, growing GNR + GPO  Stop HCTZ, add norvasc  Gentle IVF  Monitor renal function, I&O  Check urine studies  Check A1c, lipids, TSH, vit D & B12, folate, UDS  MRI results noted  BP still high  [facility] can accept  Likely dc am if BP better  Dc ivf- contributing to uncontrolled BP    DVT Prophylaxis [x] Lovenox  []  Heparin [] DOAC [] PCDs [] Ambulation    GI Prophylaxis [] PPI  [] H2 Blocker   [] Carafate  [x] Diet/Tube Feeds   Level of care [x] Med/Surg  [] Intermediate  []  ICU   Diet ADULT DIET; Regular    Family contact []  N/A    [] At bedside  [] Phone call     Discharge Plan: Will likely need KARLA when stable     +++++++++++++++++++++++++++++++++++++++++++++++++  Don Emerson MD    [facility] [place] Physician - [address] [place] [address]  +++++++++++++++++++++++++++++++++++++++++++++++++  NOTE: This report was transcribed using voice recognition software. Every effort was made to ensure accuracy; however, inadvertent computerized transcription errors may be present.

## 2022-06-10 NOTE — PROGRESS NOTES
Patient has been alert to self only my entire shift and non-cooperative for the majority. She will not hold either arm still for a BP. Her pressures have been charted but again, she will not listen to instruction and moves her arms all around during the readings.

## 2022-06-10 NOTE — CARE COORDINATION
Spoke with the pt regarding discharge plan. She is agreeable to rehab. She does nto have a preference. Referral made to [facility]. Pt accepted. PASSR/envelope completed. Pt is hypertensive. Encouraged to allow staff to medicate. She may discharge on the weekend, if stable.  Electronically signed by Yolie Vega RN on 6/10/2022 at 1:09 PM

## 2022-06-11 LAB
ALBUMIN SERPL-MCNC: 3.8 G/DL (ref 3.5–5.2)
ALP BLD-CCNC: 70 U/L (ref 35–104)
ALT SERPL-CCNC: 21 U/L (ref 0–32)
ANION GAP SERPL CALCULATED.3IONS-SCNC: 16 MMOL/L (ref 7–16)
AST SERPL-CCNC: 18 U/L (ref 0–31)
BASOPHILS ABSOLUTE: 0.05 E9/L (ref 0–0.2)
BASOPHILS RELATIVE PERCENT: 0.4 % (ref 0–2)
BILIRUB SERPL-MCNC: 0.7 MG/DL (ref 0–1.2)
BUN BLDV-MCNC: 30 MG/DL (ref 6–23)
CALCIUM SERPL-MCNC: 9.1 MG/DL (ref 8.6–10.2)
CHLORIDE BLD-SCNC: 96 MMOL/L (ref 98–107)
CO2: 23 MMOL/L (ref 22–29)
CREAT SERPL-MCNC: 1.3 MG/DL (ref 0.5–1)
EOSINOPHILS ABSOLUTE: 0.07 E9/L (ref 0.05–0.5)
EOSINOPHILS RELATIVE PERCENT: 0.6 % (ref 0–6)
GFR AFRICAN AMERICAN: 49
GFR NON-AFRICAN AMERICAN: 41 ML/MIN/1.73
GLUCOSE BLD-MCNC: 112 MG/DL (ref 74–99)
HCT VFR BLD CALC: 42.3 % (ref 34–48)
HEMOGLOBIN: 14.3 G/DL (ref 11.5–15.5)
IMMATURE GRANULOCYTES #: 0.06 E9/L
IMMATURE GRANULOCYTES %: 0.5 % (ref 0–5)
LYMPHOCYTES ABSOLUTE: 1.87 E9/L (ref 1.5–4)
LYMPHOCYTES RELATIVE PERCENT: 14.7 % (ref 20–42)
MAGNESIUM: 2.4 MG/DL (ref 1.6–2.6)
MCH RBC QN AUTO: 30.5 PG (ref 26–35)
MCHC RBC AUTO-ENTMCNC: 33.8 % (ref 32–34.5)
MCV RBC AUTO: 90.2 FL (ref 80–99.9)
MONOCYTES ABSOLUTE: 1.14 E9/L (ref 0.1–0.95)
MONOCYTES RELATIVE PERCENT: 9 % (ref 2–12)
NEUTROPHILS ABSOLUTE: 9.49 E9/L (ref 1.8–7.3)
NEUTROPHILS RELATIVE PERCENT: 74.8 % (ref 43–80)
PDW BLD-RTO: 12.8 FL (ref 11.5–15)
PLATELET # BLD: 314 E9/L (ref 130–450)
PMV BLD AUTO: 9.3 FL (ref 7–12)
POTASSIUM REFLEX MAGNESIUM: 3.4 MMOL/L (ref 3.5–5)
RBC # BLD: 4.69 E12/L (ref 3.5–5.5)
SODIUM BLD-SCNC: 135 MMOL/L (ref 132–146)
TOTAL PROTEIN: 6.7 G/DL (ref 6.4–8.3)
WBC # BLD: 12.7 E9/L (ref 4.5–11.5)

## 2022-06-11 PROCEDURE — 6370000000 HC RX 637 (ALT 250 FOR IP): Performed by: NURSE PRACTITIONER

## 2022-06-11 PROCEDURE — 85025 COMPLETE CBC W/AUTO DIFF WBC: CPT

## 2022-06-11 PROCEDURE — 1200000000 HC SEMI PRIVATE

## 2022-06-11 PROCEDURE — 6360000002 HC RX W HCPCS: Performed by: FAMILY MEDICINE

## 2022-06-11 PROCEDURE — 36415 COLL VENOUS BLD VENIPUNCTURE: CPT

## 2022-06-11 PROCEDURE — 80053 COMPREHEN METABOLIC PANEL: CPT

## 2022-06-11 PROCEDURE — 6370000000 HC RX 637 (ALT 250 FOR IP): Performed by: FAMILY MEDICINE

## 2022-06-11 PROCEDURE — 83735 ASSAY OF MAGNESIUM: CPT

## 2022-06-11 PROCEDURE — 6370000000 HC RX 637 (ALT 250 FOR IP): Performed by: INTERNAL MEDICINE

## 2022-06-11 RX ORDER — LISINOPRIL 5 MG/1
5 TABLET ORAL DAILY
Status: DISCONTINUED | OUTPATIENT
Start: 2022-06-11 | End: 2022-06-13

## 2022-06-11 RX ORDER — POTASSIUM CHLORIDE 20 MEQ/1
40 TABLET, EXTENDED RELEASE ORAL ONCE
Status: COMPLETED | OUTPATIENT
Start: 2022-06-11 | End: 2022-06-11

## 2022-06-11 RX ADMIN — LISINOPRIL 5 MG: 5 TABLET ORAL at 11:58

## 2022-06-11 RX ADMIN — PROMETHAZINE HYDROCHLORIDE 12.5 MG: 12.5 TABLET ORAL at 15:35

## 2022-06-11 RX ADMIN — HYDRALAZINE HYDROCHLORIDE 25 MG: 25 TABLET, FILM COATED ORAL at 00:17

## 2022-06-11 RX ADMIN — ENOXAPARIN SODIUM 30 MG: 100 INJECTION SUBCUTANEOUS at 21:46

## 2022-06-11 RX ADMIN — AMLODIPINE BESYLATE 5 MG: 5 TABLET ORAL at 09:06

## 2022-06-11 RX ADMIN — ATORVASTATIN CALCIUM 10 MG: 10 TABLET, FILM COATED ORAL at 21:46

## 2022-06-11 RX ADMIN — POTASSIUM CHLORIDE 40 MEQ: 20 TABLET, EXTENDED RELEASE ORAL at 11:33

## 2022-06-11 ASSESSMENT — PAIN SCALES - GENERAL: PAINLEVEL_OUTOF10: 0

## 2022-06-11 NOTE — PROGRESS NOTES
Hospitalist Progress Note      Synopsis: Patient admitted for AMS. Patient presented to the ED accompanied by her friend who reports she has been altered for about 2 days. On arrival she was hypertensive. She was found to have UTI, RLL pneumonia, and renal failure of unknown chronicity. ID was consulted for worsening leukocytosis despite tx as well as bacteremia. She is being monitored off antibiotics for now. Hospital day 4     Subjective:  Stable overnight. No issues reported. Arouses easily  Hypertensive  No fevers over last 3 days       Temp (24hrs), Av.7 °F (36.5 °C), Min:97.3 °F (36.3 °C), Max:98.2 °F (36.8 °C)    DIET: ADULT DIET; Regular  CODE: Full Code    Intake/Output Summary (Last 24 hours) at 2022 1106  Last data filed at 6/10/2022 194  Gross per 24 hour   Intake 480 ml   Output --   Net 480 ml       Review of Systems: All bolded are positive; please see HPI  General:  Fever, chills, diaphoresis, fatigue, malaise, night sweats, weight loss  Psychological:  Anxiety, disorientation, hallucinations. ENT:  Epistaxis, headaches, vertigo, visual changes. Cardiovascular:  Chest pain, irregular heartbeats, palpitations, paroxysmal nocturnal dyspnea. Respiratory:  Shortness of breath, coughing, sputum production, hemoptysis, wheezing, orthopnea.   Gastrointestinal:  Nausea, vomiting, diarrhea, heartburn, constipation, abdominal pain, hematemesis, hematochezia, melena, acholic stools  Genito-Urinary:  Dysuria, urgency, frequency, hematuria  Musculoskeletal:  Joint pain, joint stiffness, joint swelling, muscle pain  Neurology:  Headache, focal neurological deficits, weakness, numbness, paresthesia  Derm:  Rashes, ulcers, excoriations, bruising  Extremities:  Decreased ROM, peripheral edema, mottling    Objective:    BP (!) 175/96   Pulse 74   Temp 97.5 °F (36.4 °C) (Temporal)   Resp 18   Ht 6' (1.829 m)   Wt 272 lb (123.4 kg)   SpO2 96%   BMI 36.89 kg/m²     General appearance: Obese elderly female in no apparent distress, appears stated age and cooperative. HEENT: Conjunctivae/corneas clear. Mucous membranes moist.  Neck: Supple. No JVD. Respiratory:  Clear to auscultation bilaterally. Normal respiratory effort. Cardiovascular:  RRR. S1, S2 without MRG. PV: Pulses palpable. No edema. Abdomen: Soft, non-tender, non-distended. +BS  Musculoskeletal: No obvious deformities. Skin: Normal skin color. No rashes or lesions. Good turgor. Neurologic:  Grossly non-focal. Awake, alert, following commands. Medications:  REVIEWED DAILY    Infusion Medications    sodium chloride       Scheduled Medications    amLODIPine  5 mg Oral Daily    atorvastatin  10 mg Oral Nightly    vitamin D  50,000 Units Oral Weekly    enoxaparin  30 mg SubCUTAneous BID    sodium chloride flush  10 mL IntraVENous 2 times per day    miconazole   Topical BID     PRN Meds: hydrALAZINE, sodium chloride flush, sodium chloride, promethazine **OR** ondansetron, polyethylene glycol, acetaminophen **OR** acetaminophen    Labs:     Recent Labs     06/09/22  0425 06/10/22  0432 06/11/22  0624   WBC 11.9* 17.7* 12.7*   HGB 14.8 14.3 14.3   HCT 45.7 43.6 42.3    327 314       Recent Labs     06/09/22  0425 06/10/22  0432 06/11/22  0624    137 135   K 4.0 3.6 3.4*   CL 98 98 96*   CO2 21* 23 23   BUN 19 22 30*   CREATININE 1.2* 1.2* 1.3*   CALCIUM 9.3 9.4 9.1       Recent Labs     06/09/22  0425 06/10/22  0432 06/11/22  0624   PROT 7.0 7.2 6.7   ALKPHOS 71 74 70   ALT 11 13 21   AST 18 17 18   BILITOT 0.6 0.8 0.7       No results for input(s): INR in the last 72 hours. No results for input(s): Lorenso Favre in the last 72 hours.     Chronic labs:    Lab Results   Component Value Date    CHOL 229 (H) 06/09/2022    TRIG 177 (H) 06/09/2022    HDL 42 06/09/2022    LDLCALC 152 (H) 06/09/2022    TSH 0.902 06/09/2022    INR 1.0 06/06/2022    LABA1C 5.5 06/09/2022       Radiology: REVIEWED DAILY    Assessment:  Dehydration   Renal failure of unknown chronicity - suspected chornic  Right lower lobe infiltrate - artifact vs viral PNA  Intertrigo  Metabolic encephalopathy   Hyperglycemia  HLD  Leukocytosis  Hypertension, uncontrolled  Abnormal CT head  Vit D deficiency  Hypokalemia    Plan:  ID following - monitoring off ATB for now as blood culture suspected to be staph species contaminant   Urine growing only mixed nahed   Stop HCTZ, add norvasc, started lisinopril   K replacement as indicated  Monitor renal function, I&O  Check urine studies  Check A1c, lipids, TSH, vit D & B12, folate, UDS  MRI results noted  BP still high  [facility] can accept  Likely dc am if BP better      DVT Prophylaxis [x] Lovenox  []  Heparin [] DOAC [] PCDs [] Ambulation    GI Prophylaxis [] PPI  [] H2 Blocker   [] Carafate  [x] Diet/Tube Feeds   Level of care [x] Med/Surg  [] Intermediate  []  ICU   Diet ADULT DIET; Regular    Family contact []  N/A    [] At bedside  [] Phone call     Discharge Plan: Will likely need [facility] when stable     +++++++++++++++++++++++++++++++++++++++++++++++++  Seamus Villegas MD    [facility] Physician - [address] [place] [address] Rd, [place]  +++++++++++++++++++++++++++++++++++++++++++++++++  NOTE: This report was transcribed using voice recognition software. Every effort was made to ensure accuracy; however, inadvertent computerized transcription errors may be present.

## 2022-06-12 LAB
CULTURE, BLOOD 2: NORMAL
ORGANISM: ABNORMAL

## 2022-06-12 PROCEDURE — 1200000000 HC SEMI PRIVATE

## 2022-06-12 PROCEDURE — 6370000000 HC RX 637 (ALT 250 FOR IP): Performed by: NURSE PRACTITIONER

## 2022-06-12 PROCEDURE — 6370000000 HC RX 637 (ALT 250 FOR IP): Performed by: INTERNAL MEDICINE

## 2022-06-12 RX ORDER — AMLODIPINE BESYLATE 10 MG/1
10 TABLET ORAL DAILY
Status: DISCONTINUED | OUTPATIENT
Start: 2022-06-13 | End: 2022-06-13 | Stop reason: HOSPADM

## 2022-06-12 RX ADMIN — LISINOPRIL 5 MG: 5 TABLET ORAL at 08:29

## 2022-06-12 RX ADMIN — AMLODIPINE BESYLATE 5 MG: 5 TABLET ORAL at 08:30

## 2022-06-12 RX ADMIN — ATORVASTATIN CALCIUM 10 MG: 10 TABLET, FILM COATED ORAL at 21:01

## 2022-06-12 ASSESSMENT — PAIN SCALES - GENERAL
PAINLEVEL_OUTOF10: 0
PAINLEVEL_OUTOF10: 0

## 2022-06-12 NOTE — PROGRESS NOTES
Hospitalist Progress Note      Synopsis: Patient admitted for AMS. Patient presented to the ED accompanied by her friend who reports she has been altered for about 2 days. On arrival she was hypertensive. She was found to have UTI, RLL pneumonia, and renal failure of unknown chronicity. ID was consulted for worsening leukocytosis despite tx as well as bacteremia. She is being monitored off antibiotics for now. BP remains high. Hospital day 5     Subjective:  Stable overnight. No issues reported. Patient seen and examined. Adamantly refusing to go to [facility]. Wants to discharge home and stay with her sister who lives in [place]. Reports she's in the middle of buying a home. Threatening to sign out AMA stating she's being kept here like a prisoner. Discussed the need to control her BP prior to discharge. Records reviewed. Temp (24hrs), Av.4 °F (36.3 °C), Min:97.1 °F (36.2 °C), Max:97.5 °F (36.4 °C)    DIET: ADULT DIET; Regular  CODE: Full Code    Intake/Output Summary (Last 24 hours) at 2022 0930  Last data filed at 2022 1615  Gross per 24 hour   Intake 0 ml   Output --   Net 0 ml       Review of Systems: All bolded are positive; please see HPI  General:  Fever, chills, diaphoresis, fatigue, malaise, night sweats, weight loss  Psychological:  Anxiety, disorientation, hallucinations. ENT:  Epistaxis, headaches, vertigo, visual changes. Cardiovascular:  Chest pain, irregular heartbeats, palpitations, paroxysmal nocturnal dyspnea. Respiratory:  Shortness of breath, coughing, sputum production, hemoptysis, wheezing, orthopnea.   Gastrointestinal:  Nausea, vomiting, diarrhea, heartburn, constipation, abdominal pain, hematemesis, hematochezia, melena, acholic stools  Genito-Urinary:  Dysuria, urgency, frequency, hematuria  Musculoskeletal:  Joint pain, joint stiffness, joint swelling, muscle pain  Neurology:  Headache, focal neurological deficits, weakness, numbness, paresthesia  Derm: Rashes, ulcers, excoriations, bruising  Extremities:  Decreased ROM, peripheral edema, mottling    Objective:    BP (!) 198/97   Pulse 76   Temp 97.5 °F (36.4 °C) (Temporal)   Resp 18   Ht 6' (1.829 m)   Wt 272 lb (123.4 kg)   SpO2 97%   BMI 36.89 kg/m²     General appearance: Obese elderly female in no apparent distress, appears stated age and cooperative. HEENT: Conjunctivae/corneas clear. Mucous membranes moist.  Neck: Supple. No JVD. Respiratory:  Clear to auscultation bilaterally. Normal respiratory effort. Cardiovascular:  RRR. S1, S2 without MRG. PV: Pulses palpable. No edema. Abdomen: Soft, non-tender, non-distended. +BS  Musculoskeletal: No obvious deformities. Skin: Normal skin color. No rashes or lesions. Good turgor. Neurologic:  Grossly non-focal. Awake, alert, following commands. Medications:  REVIEWED DAILY    Infusion Medications    sodium chloride       Scheduled Medications    lisinopril  5 mg Oral Daily    amLODIPine  5 mg Oral Daily    atorvastatin  10 mg Oral Nightly    vitamin D  50,000 Units Oral Weekly    enoxaparin  30 mg SubCUTAneous BID    sodium chloride flush  10 mL IntraVENous 2 times per day    miconazole   Topical BID     PRN Meds: hydrALAZINE, sodium chloride flush, sodium chloride, promethazine **OR** ondansetron, polyethylene glycol, acetaminophen **OR** acetaminophen    Labs:     Recent Labs     06/10/22  0432 06/11/22 0624   WBC 17.7* 12.7*   HGB 14.3 14.3   HCT 43.6 42.3    314       Recent Labs     06/10/22  0432 06/11/22 0624    135   K 3.6 3.4*   CL 98 96*   CO2 23 23   BUN 22 30*   CREATININE 1.2* 1.3*   CALCIUM 9.4 9.1       Recent Labs     06/10/22  0432 06/11/22  0624   PROT 7.2 6.7   ALKPHOS 74 70   ALT 13 21   AST 17 18   BILITOT 0.8 0.7       No results for input(s): INR in the last 72 hours. No results for input(s): Antelmo Willshire in the last 72 hours.     Chronic labs:    Lab Results   Component Value Date    CHOL 229 (H) 06/09/2022    TRIG 177 (H) 06/09/2022    HDL 42 06/09/2022    LDLCALC 152 (H) 06/09/2022    TSH 0.902 06/09/2022    INR 1.0 06/06/2022    LABA1C 5.5 06/09/2022       Radiology: REVIEWED DAILY    Assessment:  Hypertension, uncontrolled  Dehydration   Renal failure of unknown chronicity - suspected chornic  Right lower lobe infiltrate - artifact vs viral PNA  Intertrigo  Metabolic encephalopathy   Hyperglycemia  HLD  Leukocytosis  Abnormal CT head  Vit D deficiency  Hypokalemia    Plan:  Norvasc increased, continue lisinopril  Monitor renal function, I&O  Monitor and replace electrolytes PRN  ID s/o - monitoring off ATB for now as blood culture suspected to be staph species contaminant and urine growing only mixed nahed     DVT Prophylaxis [x] Lovenox  []  Heparin [] DOAC [] PCDs [] Ambulation    GI Prophylaxis [] PPI  [] H2 Blocker   [] Carafate  [x] Diet/Tube Feeds   Level of care [x] Med/Surg  [] Intermediate  []  ICU   Diet ADULT DIET; Regular    Family contact [x]  N/A    [] At bedside  [] Phone call     Discharge Plan: [facility] vs home when stable    +++++++++++++++++++++++++++++++++++++++++++++++++  Nohemi Valerio [address]  +++++++++++++++++++++++++++++++++++++++++++++++++  NOTE: This report was transcribed using voice recognition software. Every effort was made to ensure accuracy; however, inadvertent computerized transcription errors may be present.

## 2022-06-13 VITALS
HEART RATE: 72 BPM | SYSTOLIC BLOOD PRESSURE: 143 MMHG | OXYGEN SATURATION: 96 % | HEIGHT: 72 IN | RESPIRATION RATE: 16 BRPM | WEIGHT: 272 LBS | BODY MASS INDEX: 36.84 KG/M2 | TEMPERATURE: 97.5 F | DIASTOLIC BLOOD PRESSURE: 93 MMHG

## 2022-06-13 LAB
ANION GAP SERPL CALCULATED.3IONS-SCNC: 11 MMOL/L (ref 7–16)
BLOOD CULTURE, ROUTINE: NORMAL
BUN BLDV-MCNC: 19 MG/DL (ref 6–23)
CALCIUM SERPL-MCNC: 8.9 MG/DL (ref 8.6–10.2)
CHLORIDE BLD-SCNC: 102 MMOL/L (ref 98–107)
CO2: 23 MMOL/L (ref 22–29)
CREAT SERPL-MCNC: 1.2 MG/DL (ref 0.5–1)
CULTURE, BLOOD 2: NORMAL
GFR AFRICAN AMERICAN: 54
GFR NON-AFRICAN AMERICAN: 44 ML/MIN/1.73
GLUCOSE BLD-MCNC: 131 MG/DL (ref 74–99)
HCT VFR BLD CALC: 43 % (ref 34–48)
HEMOGLOBIN: 14.1 G/DL (ref 11.5–15.5)
MCH RBC QN AUTO: 30.5 PG (ref 26–35)
MCHC RBC AUTO-ENTMCNC: 32.8 % (ref 32–34.5)
MCV RBC AUTO: 92.9 FL (ref 80–99.9)
PDW BLD-RTO: 12.8 FL (ref 11.5–15)
PLATELET # BLD: 355 E9/L (ref 130–450)
PMV BLD AUTO: 9.2 FL (ref 7–12)
POTASSIUM SERPL-SCNC: 3.8 MMOL/L (ref 3.5–5)
RBC # BLD: 4.63 E12/L (ref 3.5–5.5)
SODIUM BLD-SCNC: 136 MMOL/L (ref 132–146)
WBC # BLD: 12.9 E9/L (ref 4.5–11.5)

## 2022-06-13 PROCEDURE — 80048 BASIC METABOLIC PNL TOTAL CA: CPT

## 2022-06-13 PROCEDURE — 6370000000 HC RX 637 (ALT 250 FOR IP): Performed by: NURSE PRACTITIONER

## 2022-06-13 PROCEDURE — 36415 COLL VENOUS BLD VENIPUNCTURE: CPT

## 2022-06-13 PROCEDURE — 85027 COMPLETE CBC AUTOMATED: CPT

## 2022-06-13 PROCEDURE — 6360000002 HC RX W HCPCS: Performed by: FAMILY MEDICINE

## 2022-06-13 RX ORDER — ATORVASTATIN CALCIUM 10 MG/1
10 TABLET, FILM COATED ORAL NIGHTLY
Qty: 30 TABLET | Refills: 0 | DISCHARGE
Start: 2022-06-13

## 2022-06-13 RX ORDER — LISINOPRIL 10 MG/1
10 TABLET ORAL DAILY
Status: DISCONTINUED | OUTPATIENT
Start: 2022-06-13 | End: 2022-06-13 | Stop reason: HOSPADM

## 2022-06-13 RX ORDER — AMLODIPINE BESYLATE 10 MG/1
10 TABLET ORAL DAILY
Qty: 30 TABLET | Refills: 0 | DISCHARGE
Start: 2022-06-14

## 2022-06-13 RX ORDER — LISINOPRIL 10 MG/1
10 TABLET ORAL DAILY
Qty: 30 TABLET | Refills: 0 | DISCHARGE
Start: 2022-06-14

## 2022-06-13 RX ORDER — MELATONIN
2000 DAILY
Qty: 30 TABLET | Refills: 0 | DISCHARGE
Start: 2022-06-13

## 2022-06-13 RX ADMIN — ANTI-FUNGAL POWDER MICONAZOLE NITRATE TALC FREE: 1.42 POWDER TOPICAL at 09:09

## 2022-06-13 RX ADMIN — LISINOPRIL 10 MG: 10 TABLET ORAL at 09:08

## 2022-06-13 RX ADMIN — AMLODIPINE BESYLATE 10 MG: 10 TABLET ORAL at 09:08

## 2022-06-13 ASSESSMENT — PAIN SCALES - GENERAL
PAINLEVEL_OUTOF10: 0
PAINLEVEL_OUTOF10: 0

## 2022-06-13 NOTE — DISCHARGE SUMMARY
Hospitalist Discharge Summary    Patient ID: Jessica Blanc   Patient : 1950  Patient's PCP: No primary care provider on file. Admit Date: 2022   Admitting Physician: Sonia Diop DO    Discharge Date:  2022   Discharge Physician: GEORGIE Bell NP   Discharge Condition: Stable  Discharge Disposition: [facility] course in brief:  (Please refer to daily progress notes for a comprehensive review of the hospitalization by requesting medical records)    Patient admitted for AMS.    Patient presented to the ED accompanied by her friend who reports she has been altered for about 2 days.  On arrival she was hypertensive. Omayra Gary was found to have UTI, RLL pneumonia, and renal failure of unknown chronicity.  ID was consulted for worsening leukocytosis despite tx as well as bacteremia. She is being monitored off antibiotics for now. BP better controlled. She is now stable for discharge to [facility] with OP follow up. Consults:   IP CONSULT TO INTERNAL MEDICINE  IP CONSULT TO INFECTIOUS DISEASES    Discharge Diagnoses:  Hypertension, uncontrolled  Dehydration - RESOLVED  Renal failure of unknown chronicity - suspected chornic  Right lower lobe infiltrate - artifact vs viral PNA  Intertrigo  Metabolic encephalopathy - RESOLVED  Hyperglycemia  HLD  Leukocytosis  Vit D deficiency  Hypokalemia - RESOLVED    Discharge Instructions / Follow up: Follow-up with PCP within 1 week of discharge - recommend close monitoring of renal function  Recommend f/u with nephrology for CKD. Follow-up with consultants as indicated by them. Compliance with medications as prescribed on discharge. The patient's condition is stable. At this time the patient is without objective evidence of an acute process requiring continuing hospitalization or inpatient management. They are stable for discharge with outpatient follow-up.      I have spoken with the patient and discussed the results of the current hospitalization, in addition to providing specific details for the plan of care and counseling regarding the diagnosis and prognosis. The plan has been discussed in detail and they are aware of the specific conditions for emergent return, as well as the importance of follow-up. Their questions are answered at this time and they are agreeable with the plan for discharge to [facility]. Continued appropriate risk factor modification of blood pressure, diabetes and serum lipids will remain essential to reducing risk of future atherosclerotic development    Activity: activity as tolerated    Physical exam:  General appearance: Obese elderly female in no apparent distress, appears stated age and cooperative. HEENT: Conjunctivae/corneas clear. Mucous membranes moist.  Neck: Supple. No JVD. Respiratory:  Clear to auscultation bilaterally.  Normal respiratory effort. Cardiovascular:  RRR. S1, S2 without MRG. PV: Pulses palpable. No edema. Abdomen: Soft, non-tender, non-distended. +BS  Musculoskeletal: No obvious deformities. Skin: Normal skin color.  No rashes or lesions. Good turgor. Neurologic:  Grossly non-focal. Awake, alert, following commands. Significant labs:  CBC:   Recent Labs     06/11/22 0624 06/13/22  1058   WBC 12.7* 12.9*   RBC 4.69 4.63   HGB 14.3 14.1   HCT 42.3 43.0   MCV 90.2 92.9   RDW 12.8 12.8    355     BMP:   Recent Labs     06/11/22 0624 06/13/22  1058    136   K 3.4* 3.8   CL 96* 102   CO2 23 23   BUN 30* 19   CREATININE 1.3* 1.2*   MG 2.4  --      LFT:  Recent Labs     06/11/22 0624   PROT 6.7   ALKPHOS 70   ALT 21   AST 18   BILITOT 0.7     PT/INR: No results for input(s): INR, APTT in the last 72 hours. BNP: No results for input(s): BNP in the last 72 hours.   Hgb A1C:   Lab Results   Component Value Date    LABA1C 5.5 06/09/2022     Folate and B12: No results found for: [id number], No results found for: FOLATE  Thyroid Studies:   Lab Results Component Value Date    TSH 0.902 06/09/2022       Urinalysis:    Lab Results   Component Value Date    NITRU POSITIVE 06/06/2022    WBCUA 5-10 06/06/2022    BACTERIA MANY 06/06/2022    RBCUA NONE 06/06/2022    BLOODU Negative 06/06/2022    SPECGRAV 1.025 06/06/2022    GLUCOSEU Negative 06/06/2022       Imaging:  CT Head WO Contrast    Result Date: 6/6/2022  EXAMINATION: CT OF THE HEAD WITHOUT CONTRAST  6/6/2022 8:41 pm TECHNIQUE: CT of the head was performed without the administration of intravenous contrast. Automated exposure control, iterative reconstruction, and/or weight based adjustment of the mA/kV was utilized to reduce the radiation dose to as low as reasonably achievable. COMPARISON: None. HISTORY: ORDERING SYSTEM PROVIDED HISTORY: [facility] . TECHNOLOGIST PROVIDED HISTORY: Reason for exam:->AMS Has a \"code stroke\" or \"stroke alert\" been called? ->No Decision Support Exception - unselect if not a suspected or confirmed emergency medical condition->Emergency Medical Condition (MA) What reading provider will be dictating this exam?->CRC FINDINGS: Generalized decreased attenuation throughout white matter of both hemispheres and cerebellum. No acute intracranial hemorrhage. No abnormal extra-axial fluid collections. No hydrocephalus. Basilar cisterns are patent. Paranasal sinuses and mastoid air cells are clear     Generalized decreased attenuation throughout white matter of both hemispheres as well as cerebellum. Findings could suggest nonspecific encephalopathy. MRI brain recommended for further evaluation. XR CHEST PORTABLE    Result Date: 6/6/2022  EXAMINATION: ONE XRAY VIEW OF THE CHEST 6/6/2022 6:40 pm COMPARISON: None. HISTORY: ORDERING SYSTEM PROVIDED HISTORY: [facility] TECHNOLOGIST PROVIDED HISTORY: Reason for exam:->AMS What reading provider will be dictating this exam?->CRC FINDINGS: There are patchy opacities in the right lower lung. The left lung is clear. The heart is prominent in size.   There is no pneumothorax. The costophrenic angles are clear bilaterally. Right lower lung filtrates. MRI BRAIN WO CONTRAST    Result Date: 6/9/2022  EXAMINATION: MRI OF THE BRAIN WITHOUT CONTRAST  6/9/2022 4:19 pm TECHNIQUE: Multiplanar multisequence MRI of the brain was performed without the administration of intravenous contrast. COMPARISON: None. HISTORY: ORDERING SYSTEM PROVIDED HISTORY: Abnormal CT head TECHNOLOGIST PROVIDED HISTORY: Reason for exam:->Abnormal CT head What reading provider will be dictating this exam?->CRC FINDINGS: INTRACRANIAL STRUCTURES/VENTRICLES: The study was performed on an open MRI. Therefore, no DWI sequence could be performed, which limits evaluation for an acute infarction. Moreover, the study is significantly limited due to patient motion artifact. Within the significant limits of the study, no mass effect or midline shift is seen. T2 hyperintensity seen in the cerebral white matter likely represent chronic microvascular ischemic changes. Other etiologies are less likely but not excluded. ORBITS: The visualized portion of the orbits demonstrate no acute abnormality. SINUSES: The visualized paranasal sinuses and mastoid air cells demonstrate no acute abnormality. BONES/SOFT TISSUES: The bone marrow signal intensity appears normal. The soft tissues demonstrate no acute abnormality. 1. Markedly limited study due to extensive patient motion artifact. 2. Study was performed on an open MRI, which limits evaluation for an acute infarction. 3. Extensive T2 hyperintensity in the cerebral white matter, corresponding to the hypodensity seen on the prior CT. It is likely due to chronic microvascular ischemic changes. Other causes of leukoencephalopathy cannot be excluded. 4. If indicated, pre and post contrast enhanced MRI of the brain and a closed MRI would be advised once the patient is better able to lie still or is sedated.  RECOMMENDATIONS: Unavailable       Discharge Medications: Medication List      START taking these medications    amLODIPine 10 MG tablet  Commonly known as: NORVASC  Take 1 tablet by mouth daily  Start taking on: June 14, 2022     atorvastatin 10 MG tablet  Commonly known as: LIPITOR  Take 1 tablet by mouth nightly     lisinopril 10 MG tablet  Commonly known as: PRINIVIL;ZESTRIL  Take 1 tablet by mouth daily  Start taking on: June 14, 2022     miconazole 2 % powder  Commonly known as: MICOTIN  Apply topically 2 times daily. vitamin D3 25 MCG (1000 UT) Tabs tablet  Commonly known as: CHOLECALCIFEROL  Take 2 tablets by mouth daily        CONTINUE taking these medications    acetaminophen 500 MG tablet  Commonly known as: TYLENOL  Take 1 tablet by mouth 4 times daily as needed for Pain     ibuprofen 600 MG tablet  Commonly known as: ADVIL;MOTRIN  Take 1 tablet by mouth 3 times daily as needed for Pain           Where to Get Your Medications      Information about where to get these medications is not yet available    Ask your nurse or doctor about these medications  · amLODIPine 10 MG tablet  · atorvastatin 10 MG tablet  · lisinopril 10 MG tablet  · miconazole 2 % powder  · vitamin D3 25 MCG (1000 UT) Tabs tablet         Time Spent on discharge is more than 45 minutes in the examination, evaluation, counseling and review of medications and discharge plan.    +++++++++++++++++++++++++++++++++++++++++++++++++  Adia Alexis , GEORGIE - NP  [address]  +++++++++++++++++++++++++++++++++++++++++++++++++  NOTE: This report was transcribed using voice recognition software. Every effort was made to ensure accuracy; however, inadvertent computerized transcription errors may be present.

## 2022-06-13 NOTE — DISCHARGE INSTR - COC
Continuity of Care Form    Patient Name: Evangelina Sim   :  1950  MRN:  [id number]    Admit date:  2022  Discharge date:  ***    Code Status Order: Full Code   Advance Directives:      Admitting Physician:  Paresh Antonio DO  PCP: No primary care provider on file. Discharging Nurse: [facility] Unit/Room#: 9450/5398-G  Discharging Unit Phone Number: ***    Emergency Contact:   No emergency contact information on file. Past Surgical History:  Past Surgical History:   Procedure Laterality Date    HYSTERECTOMY      TONSILLECTOMY         Immunization History: There is no immunization history on file for this patient. Active Problems:  Patient Active Problem List   Diagnosis Code    Pneumonia J18.9       Isolation/Infection:   Isolation            No Isolation          Patient Infection Status       None to display            Nurse Assessment:  Last Vital Signs: BP (!) 143/93   Pulse 72   Temp 97.5 °F (36.4 °C) (Temporal)   Resp 16   Ht 6' (1.829 m)   Wt 272 lb (123.4 kg)   SpO2 96%   BMI 36.89 kg/m²     Last documented pain score (0-10 scale): Pain Level: 0  Last Weight:   Wt Readings from Last 1 Encounters:   22 272 lb (123.4 kg)     Mental Status:  {IP PT MENTAL STATUS:}    IV Access:  NA     Nursing Mobility/ADLs:  Walking   Min-mod  Transfer  Min assist  Bathing  Min assist  Dressing  Min assist  Toileting  Min assist  Feeding  Independent  Med Admin  PO  Med Delivery   [facility] MED Delivery:607365387}    Wound Care Documentation and Therapy:        Elimination:  Continence: Bowel: {YES / QQ:13183}  Bladder: {YES / KU:56390}  Urinary Catheter: {Urinary Catheter:173399652}   Colostomy/Ileostomy/Ileal Conduit: {YES / CB:39084}       Date of Last BM: ***    Intake/Output Summary (Last 24 hours) at 2022 1303  Last data filed at 2022 0606  Gross per 24 hour   Intake 410 ml   Output --   Net 410 ml     I/O last 3 completed shifts:   In: 0 [P.O.:590]  Out: -     Safety Concerns:     Fall Risk    Impairments/Disabilities:      [address] CROCKER Impairments/Disabilities:775797720}    Nutrition Therapy:  Current Nutrition Therapy:   [address] CROCKER Diet List:120081073}    Routes of Feeding: Oral   Liquids: {Slp liquid thickness:90053}  Daily Fluid Restriction:   NA  Last Modified Barium Swallow with Video (Video Swallowing Test): {Done Not Done FTXE:923771677}    Treatments at the Time of Hospital Discharge:   Respiratory Treatments: ***  Oxygen Therapy:  Room Air  Ventilator:    NA    Rehab Therapies: {THERAPEUTIC INTERVENTION:3648730521}  Weight Bearing Status/Restrictions: NWB  Other Medical Equipment (for information only, NOT a DME order):  {EQUIPMENT:647272503}  Other Treatments: ***    Patient's personal belongings (please select all that are sent with patient):  {P DME Belongings:820397250}    RN SIGNATURE:  {Esignature:793292443}    CASE MANAGEMENT/SOCIAL WORK SECTION    Inpatient Status Date: ***    Readmission Risk Assessment Score:  Readmission Risk              Risk of Unplanned Readmission:  9           Discharging to Facility/ Agency   Name:   Address:  Phone:  Fax:    Dialysis Facility (if applicable)   Name:  Address:  Dialysis Schedule:  Phone:  Fax:    / signature: {Esignature:522098994}    PHYSICIAN SECTION    Prognosis: {Prognosis:7979680939}    Condition at Discharge: [address] Rae Patient Condition:210862609}    Rehab Potential (if transferring to Rehab): {Prognosis:8834786811}    Recommended Labs or Other Treatments After Discharge: ***    Physician Certification: I certify the above information and transfer of Ilir Murray  is necessary for the continuing treatment of the diagnosis listed and that she requires {Admit to Appropriate Level of Care:83610} for {GREATER/LESS:641747264} 30 days.      Update Admission H&P: {CHP DME Changes in DZVLO:169143689}    PHYSICIAN SIGNATURE:  {Esignature:028759636}

## 2022-06-13 NOTE — CARE COORDINATION
6/13. Discharge plan is [facility]. Transportation has been arranged for 1:30 with [employer] transportation by the facility.  Pt, friend, nursing aware of the time Electronically signed by Amalia Cowden, RN on 6/13/2022 at 11:26 AM

## 2022-06-21 NOTE — PROGRESS NOTES
Physician Progress Note      PATIENTDaryl Check  CSN #:                  [id number]  :                       1950  ADMIT DATE:       2022 6:08 PM  [address] [place] DATE:        2022 1:30 PM  RESPONDING  PROVIDER #:        Adrien CARLSON          QUERY TEXT:    Patient admitted with HTN. Documentation of  \"finding on CXR is artifact vs   viral PNA\" per ID consult and discharge summary. In order to support the   diagnosis of viral pneumonia, please include additional clinical indicators in   your documentation. Or please document if the diagnosis of viral pneumonia   has been ruled out after further study. The medical record reflects the following:  Risk Factors: age  Clinical Indicators: AMS. CXR on : Right lower lung filtrates. No O2   requirement. No SOB noted. Treatment: IV Levaquin x1    Reece Severin, BSN, RN, [facility]  Clinical   [phone]  Options provided:  -- Viral pneumonia present as evidenced by, Please document evidence. -- Viral pneumonia  was ruled out  -- Other - I will add my own diagnosis  -- Disagree - Not applicable / Not valid  -- Disagree - Clinically unable to determine / Unknown  -- Refer to Clinical Documentation Reviewer    PROVIDER RESPONSE TEXT:    Viral pneumonia was ruled out after study. Query created by: Shani Smiley on 2022 10:29 AM      QUERY TEXT:    Patient was noted to have blood pressure of 220/200 on admission and was noted   to have uncontrolled hypertension. Patient was treated with Patient was noted   to have blood pressure of 220/200 on admission and was noted to have   uncontrolled hypertension. . After further review, can the hypertension be   further specified as:     The medical record reflects the following:  Risk Factors: Poorly controlled hypertension  Clinical Indicators: AMS for 2 days  Treatment: Hydralazine IV x5, Labetalol IV x1, Clonidine, Norvasc,  Lisinopril    Hypertensive Urgency: Defined as SBP of >180 OR DBP >120 w/o associated organ   damage. S/s may or may not be present, but can include severe headache, SOB,   epistaxis, severe anxiety. Tx: adjustment of oral BP medication; IV meds not   usually required. Hypertensive Crisis, unspecified: SBP of > 180 OR DBP > 120 and includes   damage to blood vessels, including inflammation, leakage of fluid or blood and   can cause stroke, headache, heart failure and eclampsia. Source: [employer] and Quick Reference Guide    MAGUE Perez, RN, [facility]  Clinical   [phone]  Options provided:  -- Hypertensive Crisis  -- Hypertensive Urgency  -- Other - I will add my own diagnosis  -- Disagree - Not applicable / Not valid  -- Disagree - Clinically unable to determine / Unknown  -- Refer to Clinical Documentation Reviewer    PROVIDER RESPONSE TEXT:    This patient is in hypertensive crisis. Query created by: Jerzy Lea on 6/20/2022 10:30 AM      QUERY TEXT:    Patient admitted with HTN. Noted documentation of metabolic encephalopathy in   progress notes, and discharge summary. In order to support the diagnosis of   metabolic encephalopathy, please include additional clinical indicators in   your documentation. Or please document if the diagnosis of metabolic   encephalopathy has been ruled out after further study. The medical record reflects the following:  Risk Factors: HTN  Clinical Indicators: AMS. Per nursing assessment: disoriented to place, time,   and situation. Poor judgement, safety awareness and attention. Treatment: bed alarm, telesitter    MAGUE Perez, RN, [facility]  Clinical   [phone]  Options provided:  -- Metabolic encephalopathy  present as evidenced by, Please document   evidence.   -- Hypertensive encephalopathy  -- Other - I will add my own diagnosis  -- Disagree - Not applicable / Not valid  -- Disagree - Clinically unable to determine / Unknown  -- Refer to Clinical Documentation Reviewer    PROVIDER RESPONSE TEXT:    This patient has hypertensive encephalopathy.     Query created by: Edenilson Krishnan on 6/20/2022 10:30 AM      Electronically signed by:  Meghann Sandra 6/21/2022 2:40 PM

## 2022-06-22 NOTE — PROGRESS NOTES
Physician Progress Note      PATIENTSevera Meeter  [facility] #:                  [id number]  :                       1950  ADMIT DATE:       2022 6:08 PM  [address] [place] DATE:        2022 1:30 PM  RESPONDING  PROVIDER #:        Bernice Najjar THOMAS          QUERY TEXT:    Patient admitted with HTN. Noted documentation of UTI in H&P and DC Summary. Patient received 1 dose of antibiotics, discontinued by the ID consultant, and   none resumed or ordered for the remainder of the stay. In order to support   the diagnosis of UTI, please include additional clinical indicators in your   documentation. Or please document if the diagnosis of UTI has been ruled out   after further study. The medical record reflects the following:  Risk Factors: female  Clinical Indicators: Urine WBC 5-10, negative Urine Leukocyte Esterase. Urine   culture showed 10 to 100,000 CFU/mL Mixed nahed isolated. Treatment: IV Levaquin x1, UA, urine culture    CHRISTI MossN, RN, [facility]  Clinical   [phone]  Options provided:  -- UTI present as evidenced by, Please document evidence. -- UTI was ruled out  -- Other - I will add my own diagnosis  -- Disagree - Not applicable / Not valid  -- Disagree - Clinically unable to determine / Unknown  -- Refer to Clinical Documentation Reviewer    PROVIDER RESPONSE TEXT:    UTI was ruled out after study.     Query created by: Danuta Caballero on 2022 10:31 AM      Electronically signed by:  Debbie Estrada 2022 7:01 AM

## 2022-11-28 ENCOUNTER — OFFICE VISIT (OUTPATIENT)
Dept: CARDIOLOGY CLINIC | Age: 72
End: 2022-11-28
Payer: MEDICARE

## 2022-11-28 VITALS
RESPIRATION RATE: 18 BRPM | HEIGHT: 72 IN | HEART RATE: 69 BPM | SYSTOLIC BLOOD PRESSURE: 148 MMHG | WEIGHT: 293 LBS | BODY MASS INDEX: 39.68 KG/M2 | DIASTOLIC BLOOD PRESSURE: 84 MMHG

## 2022-11-28 DIAGNOSIS — I10 HYPERTENSION, UNSPECIFIED TYPE: Primary | ICD-10-CM

## 2022-11-28 DIAGNOSIS — R06.02 SOB (SHORTNESS OF BREATH): ICD-10-CM

## 2022-11-28 DIAGNOSIS — F20.0 PARANOID SCHIZOPHRENIA (HCC): ICD-10-CM

## 2022-11-28 DIAGNOSIS — E78.5 HYPERLIPIDEMIA, UNSPECIFIED HYPERLIPIDEMIA TYPE: ICD-10-CM

## 2022-11-28 DIAGNOSIS — R60.9 PERIPHERAL EDEMA: ICD-10-CM

## 2022-11-28 DIAGNOSIS — I50.9 CONGESTIVE HEART FAILURE, UNSPECIFIED HF CHRONICITY, UNSPECIFIED HEART FAILURE TYPE (HCC): ICD-10-CM

## 2022-11-28 PROBLEM — E66.9 OBESITY: Status: ACTIVE | Noted: 2022-11-28

## 2022-11-28 PROBLEM — F20.9 SCHIZOPHRENIA (HCC): Status: ACTIVE | Noted: 2022-11-28

## 2022-11-28 PROBLEM — J18.9 PNEUMONIA: Status: RESOLVED | Noted: 2022-06-07 | Resolved: 2022-11-28

## 2022-11-28 PROCEDURE — 93000 ELECTROCARDIOGRAM COMPLETE: CPT | Performed by: INTERNAL MEDICINE

## 2022-11-28 PROCEDURE — G8484 FLU IMMUNIZE NO ADMIN: HCPCS | Performed by: INTERNAL MEDICINE

## 2022-11-28 PROCEDURE — 99204 OFFICE O/P NEW MOD 45 MIN: CPT | Performed by: INTERNAL MEDICINE

## 2022-11-28 PROCEDURE — G8400 PT W/DXA NO RESULTS DOC: HCPCS | Performed by: INTERNAL MEDICINE

## 2022-11-28 PROCEDURE — 3074F SYST BP LT 130 MM HG: CPT | Performed by: INTERNAL MEDICINE

## 2022-11-28 PROCEDURE — G8417 CALC BMI ABV UP PARAM F/U: HCPCS | Performed by: INTERNAL MEDICINE

## 2022-11-28 PROCEDURE — 4004F PT TOBACCO SCREEN RCVD TLK: CPT | Performed by: INTERNAL MEDICINE

## 2022-11-28 PROCEDURE — 3017F COLORECTAL CA SCREEN DOC REV: CPT | Performed by: INTERNAL MEDICINE

## 2022-11-28 PROCEDURE — 3078F DIAST BP <80 MM HG: CPT | Performed by: INTERNAL MEDICINE

## 2022-11-28 PROCEDURE — 1123F ACP DISCUSS/DSCN MKR DOCD: CPT | Performed by: INTERNAL MEDICINE

## 2022-11-28 PROCEDURE — G8427 DOCREV CUR MEDS BY ELIG CLIN: HCPCS | Performed by: INTERNAL MEDICINE

## 2022-11-28 PROCEDURE — 1090F PRES/ABSN URINE INCON ASSESS: CPT | Performed by: INTERNAL MEDICINE

## 2022-11-28 RX ORDER — ONDANSETRON 4 MG/1
4 TABLET, FILM COATED ORAL PRN
COMMUNITY

## 2022-11-28 RX ORDER — ALBUTEROL SULFATE 2.5 MG/3ML
2.5 SOLUTION RESPIRATORY (INHALATION) EVERY 6 HOURS PRN
COMMUNITY

## 2022-11-28 RX ORDER — TRAMADOL HYDROCHLORIDE 50 MG/1
50 TABLET ORAL 2 TIMES DAILY
COMMUNITY

## 2022-11-28 RX ORDER — HYDRALAZINE HYDROCHLORIDE 10 MG/1
10 TABLET, FILM COATED ORAL 2 TIMES DAILY
COMMUNITY

## 2022-11-28 RX ORDER — CARVEDILOL 3.12 MG/1
3.12 TABLET ORAL 2 TIMES DAILY WITH MEALS
COMMUNITY

## 2022-11-28 RX ORDER — PHENOL 1.4 %
AEROSOL, SPRAY (ML) MUCOUS MEMBRANE
COMMUNITY

## 2022-11-28 RX ORDER — FUROSEMIDE 40 MG/1
40 TABLET ORAL 2 TIMES DAILY
COMMUNITY

## 2022-11-28 RX ORDER — POTASSIUM CHLORIDE 1.5 G/1.77G
20 POWDER, FOR SOLUTION ORAL DAILY
COMMUNITY

## 2022-11-28 NOTE — PROGRESS NOTES
Chief Complaint   Patient presents with    Heart Problem       Patient Active Problem List    Diagnosis Date Noted    Schizophrenia ([facility] [address] 11/28/2022     Priority: Medium    HTN (hypertension) 11/28/2022    HLD (hyperlipidemia) 11/28/2022    Obesity 11/28/2022       Current Outpatient Medications   Medication Sig Dispense Refill    BISACODYL RE Place 5 mg rectally daily      carvedilol (COREG) 3.125 MG tablet Take 3.125 mg by mouth 2 times daily (with meals)      hydrALAZINE (APRESOLINE) 10 MG tablet Take 10 mg by mouth in the morning and at bedtime      albuterol (PROVENTIL) (2.5 MG/3ML) 0.083% nebulizer solution Take 2.5 mg by nebulization every 6 hours as needed for Wheezing      furosemide (LASIX) 40 MG tablet Take 40 mg by mouth in the morning and 40 mg in the evening. Melatonin 10 MG TABS Take by mouth nightly as needed      potassium chloride (KLOR-CON) 20 MEQ packet Take 20 mEq by mouth daily      ondansetron (ZOFRAN) 4 MG tablet Take 4 mg by mouth as needed for Nausea or Vomiting      traMADol (ULTRAM) 50 MG tablet Take 50 mg by mouth in the morning and at bedtime. diclofenac sodium (VOLTAREN) 1 % GEL Apply topically as needed for Pain      atorvastatin (LIPITOR) 10 MG tablet Take 1 tablet by mouth nightly (Patient taking differently: Take 20 mg by mouth nightly) 30 tablet 0    lisinopril (PRINIVIL;ZESTRIL) 10 MG tablet Take 1 tablet by mouth daily (Patient taking differently: Take 20 mg by mouth daily) 30 tablet 0    miconazole (MICOTIN) 2 % powder Apply topically 2 times daily.  45 g 0    vitamin D3 (CHOLECALCIFEROL) 25 MCG (1000 UT) TABS tablet Take 2 tablets by mouth daily (Patient taking differently: Take 1,000 Units by mouth daily) 30 tablet 0    acetaminophen (TYLENOL) 500 MG tablet Take 1 tablet by mouth 4 times daily as needed for Pain 120 tablet 0     Current Facility-Administered Medications   Medication Dose Route Frequency Provider Last Rate Last Admin    perflutren lipid microspheres (DEFINITY) injection 1.5 mL  1.5 mL IntraVENous ONCE PRN Nicolas Madden MD            Allergies   Allergen Reactions    Pcn [Penicillins]      \"I don't breathe. \"       Vitals:    11/28/22 1128 11/28/22 1136   BP: (!) 162/145 (!) 148/84   Pulse: 69    Resp: 18    Weight: (!) 315 lb (142.9 kg)    Height: 6' (1.829 m)                  SUBJECTIVE: Steve Bran presents to the office today for consult. 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 Lives in [place]. Consult reason is \"CHF\"    She complains of  no issues  and denies   chest pain, dyspnea, orthopnea, and paroxysmal nocturnal dyspnea  She does have periperal edema, but comments only on knee pain. Is in wheelchair, but says she walks at [place]  History not reliable  . Physical Exam   BP (!) 148/84   Pulse 69   Resp 18   Ht 6' (1.829 m)   Wt (!) 315 lb (142.9 kg)   BMI 42.72 kg/m²   Constitutional: obese  No distress. Head: Normocephalic and atraumatic. Neck: No JVD present. Carotid bruit is not present. Cardiovascular: Normal rate, regular rhythm, normal heart sounds and intact distal pulses. No gallop and no friction rub. No murmur heard. Pulmonary/Chest: Effort normal and breath sounds normal.   Abdominal: Soft. Bowel sounds are normal. No distension and no mass. Musculoskeletal: 2+ bilateral mid shine edema   Neurological: Alert and oriented to person, place, and time. Skin: Skin is warm and dry. No rash noted. Psychiatric: Normal mood and affect.  Behavior is normal.     EKG:  normal sinus rhythm, poor R wave progression,  remote anterior MI.    ASSESSMENT AND PLAN:  Patient Active Problem List   Diagnosis    HTN (hypertension)    HLD (hyperlipidemia)    Obesity    Schizophrenia (HCC)     Elderly, sedentary, obese, schizophrenic with peripheral edema likely due to former  No signs/symptoms of left heart failure  EKG with poor R wave progression, unlikely to be due to ischemic heart disease, will echo  Needs salt restricted diet, activity, weight loss and support stockings.   Can consider changing her loop diuretic to torsemide which will be better absorbed      Michael Mittal M.D  [facility] Cardiology

## 2023-01-19 ENCOUNTER — TELEPHONE (OUTPATIENT)
Dept: CARDIOLOGY | Age: 73
End: 2023-01-19

## 2023-01-30 ENCOUNTER — HOSPITAL ENCOUNTER (OUTPATIENT)
Dept: CARDIOLOGY | Age: 73
Discharge: HOME | End: 2023-01-30
Payer: MEDICARE

## 2023-01-30 DIAGNOSIS — R06.02 SOB (SHORTNESS OF BREATH): ICD-10-CM

## 2023-01-30 DIAGNOSIS — R60.9 PERIPHERAL EDEMA: ICD-10-CM

## 2023-01-30 DIAGNOSIS — I50.9 CONGESTIVE HEART FAILURE, UNSPECIFIED HF CHRONICITY, UNSPECIFIED HEART FAILURE TYPE (HCC): ICD-10-CM

## 2023-01-30 DIAGNOSIS — E78.5 HYPERLIPIDEMIA, UNSPECIFIED HYPERLIPIDEMIA TYPE: ICD-10-CM

## 2023-01-30 DIAGNOSIS — I10 HYPERTENSION, UNSPECIFIED TYPE: ICD-10-CM

## 2023-01-30 LAB
LV EF: 60 %
LVEF MODALITY: NORMAL

## 2023-01-30 PROCEDURE — 93306 TTE W/DOPPLER COMPLETE: CPT | Performed by: PSYCHIATRY & NEUROLOGY

## 2023-01-31 ENCOUNTER — TELEPHONE (OUTPATIENT)
Dept: CARDIOLOGY CLINIC | Age: 73
End: 2023-01-31

## 2023-01-31 NOTE — TELEPHONE ENCOUNTER
----- Message from Kylee Jorgensen MD sent at 1/31/2023  6:30 AM EST -----  Echo fine  Change furosemide to torsemide 20 mg bid for better absorption and diuresis  F/u with PCP      ----- Message -----  From: Traci Anthony Incoming Cardiology Results From [facility]  Sent: 1/31/2023   6:19 AM EST  To: Kylee Jorgensen MD

## 2023-01-31 NOTE — TELEPHONE ENCOUNTER
Spoke with Darek Vann (nurse) at [facility] regarding echo and med changes. She stated understanding.